# Patient Record
Sex: FEMALE | Race: WHITE | NOT HISPANIC OR LATINO | URBAN - METROPOLITAN AREA
[De-identification: names, ages, dates, MRNs, and addresses within clinical notes are randomized per-mention and may not be internally consistent; named-entity substitution may affect disease eponyms.]

---

## 2018-06-13 ENCOUNTER — OUTPATIENT (OUTPATIENT)
Dept: OUTPATIENT SERVICES | Facility: HOSPITAL | Age: 73
LOS: 1 days | Discharge: HOME | End: 2018-06-13
Payer: MEDICARE

## 2018-06-13 VITALS
HEIGHT: 64 IN | WEIGHT: 159.61 LBS | SYSTOLIC BLOOD PRESSURE: 159 MMHG | TEMPERATURE: 98 F | OXYGEN SATURATION: 99 % | HEART RATE: 75 BPM | DIASTOLIC BLOOD PRESSURE: 99 MMHG | RESPIRATION RATE: 16 BRPM

## 2018-06-13 DIAGNOSIS — Z01.818 ENCOUNTER FOR OTHER PREPROCEDURAL EXAMINATION: ICD-10-CM

## 2018-06-13 DIAGNOSIS — E05.00 THYROTOXICOSIS WITH DIFFUSE GOITER WITHOUT THYROTOXIC CRISIS OR STORM: ICD-10-CM

## 2018-06-13 DIAGNOSIS — M16.12 UNILATERAL PRIMARY OSTEOARTHRITIS, LEFT HIP: ICD-10-CM

## 2018-06-13 DIAGNOSIS — E78.5 HYPERLIPIDEMIA, UNSPECIFIED: ICD-10-CM

## 2018-06-13 DIAGNOSIS — M25.559 PAIN IN UNSPECIFIED HIP: ICD-10-CM

## 2018-06-13 DIAGNOSIS — Z98.890 OTHER SPECIFIED POSTPROCEDURAL STATES: Chronic | ICD-10-CM

## 2018-06-13 DIAGNOSIS — R01.1 CARDIAC MURMUR, UNSPECIFIED: ICD-10-CM

## 2018-06-13 DIAGNOSIS — M12.811 OTHER SPECIFIC ARTHROPATHIES, NOT ELSEWHERE CLASSIFIED, RIGHT SHOULDER: Chronic | ICD-10-CM

## 2018-06-13 LAB
ANION GAP SERPL CALC-SCNC: 7 MMOL/L — SIGNIFICANT CHANGE UP (ref 5–17)
APTT BLD: 30.7 SEC — SIGNIFICANT CHANGE UP (ref 27.5–37.4)
BASOPHILS # BLD AUTO: 0.02 K/UL — SIGNIFICANT CHANGE UP (ref 0–0.2)
BASOPHILS NFR BLD AUTO: 0.3 % — SIGNIFICANT CHANGE UP (ref 0–2)
BUN SERPL-MCNC: 17 MG/DL — SIGNIFICANT CHANGE UP (ref 7–23)
CALCIUM SERPL-MCNC: 9.4 MG/DL — SIGNIFICANT CHANGE UP (ref 8.5–10.1)
CHLORIDE SERPL-SCNC: 106 MMOL/L — SIGNIFICANT CHANGE UP (ref 96–108)
CO2 SERPL-SCNC: 30 MMOL/L — SIGNIFICANT CHANGE UP (ref 22–31)
CREAT SERPL-MCNC: 0.75 MG/DL — SIGNIFICANT CHANGE UP (ref 0.5–1.3)
EOSINOPHIL # BLD AUTO: 0.03 K/UL — SIGNIFICANT CHANGE UP (ref 0–0.5)
EOSINOPHIL NFR BLD AUTO: 0.5 % — SIGNIFICANT CHANGE UP (ref 0–6)
GLUCOSE SERPL-MCNC: 82 MG/DL — SIGNIFICANT CHANGE UP (ref 70–99)
HBA1C BLD-MCNC: 5.6 % — SIGNIFICANT CHANGE UP (ref 4–5.6)
HCT VFR BLD CALC: 45.8 % — HIGH (ref 34.5–45)
HGB BLD-MCNC: 14 G/DL — SIGNIFICANT CHANGE UP (ref 11.5–15.5)
IMM GRANULOCYTES NFR BLD AUTO: 0.2 % — SIGNIFICANT CHANGE UP (ref 0–1.5)
INR BLD: 0.96 RATIO — SIGNIFICANT CHANGE UP (ref 0.88–1.16)
LYMPHOCYTES # BLD AUTO: 1.73 K/UL — SIGNIFICANT CHANGE UP (ref 1–3.3)
LYMPHOCYTES # BLD AUTO: 26.1 % — SIGNIFICANT CHANGE UP (ref 13–44)
MCHC RBC-ENTMCNC: 27.3 PG — SIGNIFICANT CHANGE UP (ref 27–34)
MCHC RBC-ENTMCNC: 30.6 GM/DL — LOW (ref 32–36)
MCV RBC AUTO: 89.3 FL — SIGNIFICANT CHANGE UP (ref 80–100)
MONOCYTES # BLD AUTO: 0.41 K/UL — SIGNIFICANT CHANGE UP (ref 0–0.9)
MONOCYTES NFR BLD AUTO: 6.2 % — SIGNIFICANT CHANGE UP (ref 2–14)
NEUTROPHILS # BLD AUTO: 4.44 K/UL — SIGNIFICANT CHANGE UP (ref 1.8–7.4)
NEUTROPHILS NFR BLD AUTO: 66.7 % — SIGNIFICANT CHANGE UP (ref 43–77)
PLATELET # BLD AUTO: 278 K/UL — SIGNIFICANT CHANGE UP (ref 150–400)
POTASSIUM SERPL-MCNC: 4.2 MMOL/L — SIGNIFICANT CHANGE UP (ref 3.5–5.3)
POTASSIUM SERPL-SCNC: 4.2 MMOL/L — SIGNIFICANT CHANGE UP (ref 3.5–5.3)
PROTHROM AB SERPL-ACNC: 10.4 SEC — SIGNIFICANT CHANGE UP (ref 9.8–12.7)
RBC # BLD: 5.13 M/UL — SIGNIFICANT CHANGE UP (ref 3.8–5.2)
RBC # FLD: 13.4 % — SIGNIFICANT CHANGE UP (ref 10.3–14.5)
SODIUM SERPL-SCNC: 143 MMOL/L — SIGNIFICANT CHANGE UP (ref 135–145)
WBC # BLD: 6.64 K/UL — SIGNIFICANT CHANGE UP (ref 3.8–10.5)
WBC # FLD AUTO: 6.64 K/UL — SIGNIFICANT CHANGE UP (ref 3.8–10.5)

## 2018-06-13 PROCEDURE — 93010 ELECTROCARDIOGRAM REPORT: CPT | Mod: NC

## 2018-06-13 NOTE — H&P PST ADULT - NSANTHOSAYNRD_GEN_A_CORE
No. STEFANIA screening performed.  STOP BANG Legend: 0-2 = LOW Risk; 3-4 = INTERMEDIATE Risk; 5-8 = HIGH Risk

## 2018-06-13 NOTE — PHYSICAL THERAPY INITIAL EVALUATION ADULT - MODIFIED CLINICAL TEST OF SENSORY INTEGRATION IN BALANCE TEST
5x sit to stand = 14.66 seconds. 2MWT = 280ft with no device (no gait deviations). Stairs: step-over-step with R rail for ascent (pt states at end of day she typically performs step-to-step due to fatigue)

## 2018-06-13 NOTE — PATIENT PROFILE ADULT. - VISION (WITH CORRECTIVE LENSES IF THE PATIENT USUALLY WEARS THEM):
Partially impaired: cannot see medication labels or newsprint, but can see obstacles in path, and the surrounding layout; can count fingers at arm's length/Glassess for distance

## 2018-06-13 NOTE — H&P PST ADULT - VISION (WITH CORRECTIVE LENSES IF THE PATIENT USUALLY WEARS THEM):
Glassess for distance/Partially impaired: cannot see medication labels or newsprint, but can see obstacles in path, and the surrounding layout; can count fingers at arm's length

## 2018-06-13 NOTE — H&P PST ADULT - LYMPH NODES
Problem: Goal Outcome Summary  Goal: Goal Outcome Summary  OT/6C:  Improved participation noted this session compared to previous.  Pt tolerates ~7 minutes of standing ADLs at sink with set up and SBA with focus on improving endurance and strength.  Pt ambulates ~75' X2 with SBA and pt pushing IV pole with B UEs, requires 1 seated rest break between bouts due to fatigue and SOB.  SpO2 dips to 74% on 4L via NC with ambulation but recovers >90% within seconds of seated rest and PLB.  Limited by fatigue and endurance.       REC:  TCU for increased safety and IND with I/ADLs and functional mobility.       No lymphadedenopathy

## 2018-06-13 NOTE — PATIENT PROFILE ADULT. - PSH
H/O breast biopsy    Rotator cuff arthropathy, right H/O breast biopsy  left  Rotator cuff arthropathy, right

## 2018-06-13 NOTE — PHYSICAL THERAPY INITIAL EVALUATION ADULT - ADDITIONAL COMMENTS
Pt lives with spouse (whom can provide assist with all functional mobility upon D/C home) in a private home, 7 entry steps (+ rail), 6 steps inside (+ rail). Pt has a walk in shower stall with a fixed shower head, no grab bars, comfort height toilet seat. Pt is independent with all functional mobility including community ambulation without a device & ADL's. Pt is very active. Pt had R RTC sx in the past. Pt has a torn L meniscus. Pt is retired. Goal of therapy: improve functional mobility. Pt has no DMEs.

## 2018-06-13 NOTE — H&P PST ADULT - ASSESSMENT
left hip osteoarthritis left hip osteoarthritis   CAPRINI SCORE    AGE RELATED RISK FACTORS                                                       MOBILITY RELATED FACTORS  [ ] Age 41-60 years                                            (1 Point)                  [ ] Bed rest                                                        (1 Point)  [ ] Age: 61-74 years                                           (2 Points)                [ ] Plaster cast                                                   (2 Points)  [x ] Age= 75 years                                              (3 Points)                 [ ] Bed bound for more than 72 hours                   (2 Points)    DISEASE RELATED RISK FACTORS                                               GENDER SPECIFIC FACTORS  [ ] Edema in the lower extremities                       (1 Point)                  [ ] Pregnancy                                                     (1 Point)  [ ] Varicose veins                                               (1 Point)                  [ ] Post-partum < 6 weeks                                   (1 Point)             [x ] BMI > 25 Kg/m2                                            (1 Point)                  [ ] Hormonal therapy  or oral contraception            (1 Point)                 [ ] Sepsis (in the previous month)                        (1 Point)                  [ ] History of pregnancy complications  [ ] Pneumonia or serious lung disease                                               [ ] Unexplained or recurrent                       (1 Point)           (in the previous month)                               (1 Point)  [ ] Abnormal pulmonary function test                     (1 Point)                 SURGERY RELATED RISK FACTORS  [ ] Acute myocardial infarction                              (1 Point)                 [ ]  Section                                            (1 Point)  [ ] Congestive heart failure (in the previous month)  (1 Point)                 [ ] Minor surgery                                                 (1 Point)   [ ] Inflammatory bowel disease                             (1 Point)                 [ ] Arthroscopic surgery                                        (2 Points)  [ ] Central venous access                                    (2 Points)                [ ] General surgery lasting more than 45 minutes   (2 Points)       [ ] Stroke (in the previous month)                          (5 Points)               [x ] Elective arthroplasty                                        (5 Points)                                                                                                                                               HEMATOLOGY RELATED FACTORS                                                 TRAUMA RELATED RISK FACTORS  [ ] Prior episodes of VTE                                     (3 Points)                 [ ] Fracture of the hip, pelvis, or leg                       (5 Points)  [ ] Positive family history for VTE                         (3 Points)                 [ ] Acute spinal cord injury (in the previous month)  (5 Points)  [ ] Prothrombin 87140 A                                      (3 Points)                 [ ] Paralysis  (less than 1 month)                          (5 Points)  [ ] Factor V Leiden                                             (3 Points)                 [ ] Multiple Trauma within 1 month                         (5 Points)  [ ] Lupus anticoagulants                                     (3 Points)                                                           [ ] Anticardiolipin antibodies                                (3 Points)                                                       [ ] High homocysteine in the blood                      (3 Points)                                             [ ] Other congenital or acquired thrombophilia       (3 Points)                                                [ ] Heparin induced thrombocytopenia                  (3 Points)                                          Total Score [  9        ]

## 2018-06-14 LAB
MRSA PCR RESULT.: SIGNIFICANT CHANGE UP
S AUREUS DNA NOSE QL NAA+PROBE: SIGNIFICANT CHANGE UP

## 2018-06-14 NOTE — OCCUPATIONAL THERAPY INITIAL EVALUATION ADULT - PERTINENT HX OF CURRENT PROBLEM, REHAB EVAL
Pt is slated for elective surgery for left, THR due to OA, pain and DJD. Pt has a history of multiple comorbidities.

## 2018-06-14 NOTE — OCCUPATIONAL THERAPY INITIAL EVALUATION ADULT - ADDITIONAL COMMENTS
Pt is functioning in her roles, self sufficient, driving & ambulating independently in the community without any assistive devices. Pt is right hand dominant and wears glasses for reading. Pt owns no DME Pt denied pain , but she reports she experiences pain with running, walking and kick boxing. Pt is functioning in her roles, self sufficient, driving & ambulating independently in the community without any assistive devices. Pt is right hand dominant and wears glasses for reading. Pt owns no DME .Pt denied pain , but she reports she experiences pain with running, walking and kick boxing.

## 2018-06-14 NOTE — OCCUPATIONAL THERAPY INITIAL EVALUATION ADULT - LIGHT TOUCH SENSATION, HEAD/NECK, REHAB EVAL
Date & Time: 6/2/2017, 8:53 PM  Patient: Shasha Buck  Attending Provider: Analy Whitaker MD      This certifies that I, Adela Para, a patient at an [facility], am leaving the facility voluntarily and against the advice within normal limits

## 2018-06-14 NOTE — OCCUPATIONAL THERAPY INITIAL EVALUATION ADULT - SOCIAL CONCERNS
Complex psychosocial needs/coping issues/Pt voiced concerns about her recovery at home. Pt has the support of her spouse to assist her after discharge home post-operatively.

## 2018-06-14 NOTE — OCCUPATIONAL THERAPY INITIAL EVALUATION ADULT - LIVES WITH, PROFILE
spouse/private house with 7 steps to  enter with  right hand rail. Once inside, pt has to negotiate a flight of stairs to access the bedroom and bathroom. The bathroom has a walk in shower and comfort height toilet.

## 2018-06-14 NOTE — OCCUPATIONAL THERAPY INITIAL EVALUATION ADULT - RANGE OF MOTION EXAMINATION, LOWER EXTREMITY
Left LE Active ROM was WFL (within functional limits)/Right LE Active ROM was WNL(within normal limits)/Right LE Passive ROM was WNL (within normal limits)/Left LE Passive ROM was WFL (w/i functional limits)

## 2018-06-26 ENCOUNTER — TRANSCRIPTION ENCOUNTER (OUTPATIENT)
Age: 73
End: 2018-06-26

## 2018-06-26 RX ORDER — ACETAMINOPHEN 500 MG
650 TABLET ORAL ONCE
Qty: 0 | Refills: 0 | Status: COMPLETED | OUTPATIENT
Start: 2018-06-27 | End: 2018-06-27

## 2018-06-27 ENCOUNTER — RESULT REVIEW (OUTPATIENT)
Age: 73
End: 2018-06-27

## 2018-06-27 ENCOUNTER — INPATIENT (INPATIENT)
Facility: HOSPITAL | Age: 73
LOS: 0 days | Discharge: HOME HEALTH | End: 2018-06-28
Attending: ORTHOPAEDIC SURGERY | Admitting: ORTHOPAEDIC SURGERY
Payer: MEDICARE

## 2018-06-27 ENCOUNTER — TRANSCRIPTION ENCOUNTER (OUTPATIENT)
Age: 73
End: 2018-06-27

## 2018-06-27 VITALS
HEART RATE: 85 BPM | SYSTOLIC BLOOD PRESSURE: 144 MMHG | RESPIRATION RATE: 16 BRPM | DIASTOLIC BLOOD PRESSURE: 79 MMHG | TEMPERATURE: 97 F | WEIGHT: 159.61 LBS | HEIGHT: 64 IN

## 2018-06-27 DIAGNOSIS — M16.12 UNILATERAL PRIMARY OSTEOARTHRITIS, LEFT HIP: ICD-10-CM

## 2018-06-27 DIAGNOSIS — E03.9 HYPOTHYROIDISM, UNSPECIFIED: ICD-10-CM

## 2018-06-27 DIAGNOSIS — M12.811 OTHER SPECIFIC ARTHROPATHIES, NOT ELSEWHERE CLASSIFIED, RIGHT SHOULDER: Chronic | ICD-10-CM

## 2018-06-27 DIAGNOSIS — Z98.890 OTHER SPECIFIED POSTPROCEDURAL STATES: Chronic | ICD-10-CM

## 2018-06-27 DIAGNOSIS — E78.5 HYPERLIPIDEMIA, UNSPECIFIED: ICD-10-CM

## 2018-06-27 LAB
ANION GAP SERPL CALC-SCNC: 6 MMOL/L — SIGNIFICANT CHANGE UP (ref 5–17)
BUN SERPL-MCNC: 12 MG/DL — SIGNIFICANT CHANGE UP (ref 7–23)
CALCIUM SERPL-MCNC: 8.4 MG/DL — LOW (ref 8.5–10.1)
CHLORIDE SERPL-SCNC: 108 MMOL/L — SIGNIFICANT CHANGE UP (ref 96–108)
CO2 SERPL-SCNC: 29 MMOL/L — SIGNIFICANT CHANGE UP (ref 22–31)
CREAT SERPL-MCNC: 0.75 MG/DL — SIGNIFICANT CHANGE UP (ref 0.5–1.3)
GLUCOSE SERPL-MCNC: 92 MG/DL — SIGNIFICANT CHANGE UP (ref 70–99)
HCT VFR BLD CALC: 39.3 % — SIGNIFICANT CHANGE UP (ref 34.5–45)
HGB BLD-MCNC: 12.4 G/DL — SIGNIFICANT CHANGE UP (ref 11.5–15.5)
MCHC RBC-ENTMCNC: 27.9 PG — SIGNIFICANT CHANGE UP (ref 27–34)
MCHC RBC-ENTMCNC: 31.6 GM/DL — LOW (ref 32–36)
MCV RBC AUTO: 88.5 FL — SIGNIFICANT CHANGE UP (ref 80–100)
NRBC # BLD: 0 /100 WBCS — SIGNIFICANT CHANGE UP (ref 0–0)
PLATELET # BLD AUTO: 226 K/UL — SIGNIFICANT CHANGE UP (ref 150–400)
POTASSIUM SERPL-MCNC: 4 MMOL/L — SIGNIFICANT CHANGE UP (ref 3.5–5.3)
POTASSIUM SERPL-SCNC: 4 MMOL/L — SIGNIFICANT CHANGE UP (ref 3.5–5.3)
RBC # BLD: 4.44 M/UL — SIGNIFICANT CHANGE UP (ref 3.8–5.2)
RBC # FLD: 13.5 % — SIGNIFICANT CHANGE UP (ref 10.3–14.5)
SODIUM SERPL-SCNC: 143 MMOL/L — SIGNIFICANT CHANGE UP (ref 135–145)
WBC # BLD: 8.68 K/UL — SIGNIFICANT CHANGE UP (ref 3.8–10.5)
WBC # FLD AUTO: 8.68 K/UL — SIGNIFICANT CHANGE UP (ref 3.8–10.5)

## 2018-06-27 PROCEDURE — 88311 DECALCIFY TISSUE: CPT | Mod: 26

## 2018-06-27 PROCEDURE — 88305 TISSUE EXAM BY PATHOLOGIST: CPT | Mod: 26

## 2018-06-27 PROCEDURE — 72170 X-RAY EXAM OF PELVIS: CPT | Mod: 26

## 2018-06-27 PROCEDURE — 99223 1ST HOSP IP/OBS HIGH 75: CPT

## 2018-06-27 RX ORDER — LEVOTHYROXINE SODIUM 125 MCG
125 TABLET ORAL DAILY
Qty: 0 | Refills: 0 | Status: DISCONTINUED | OUTPATIENT
Start: 2018-06-27 | End: 2018-06-28

## 2018-06-27 RX ORDER — ACETAMINOPHEN 500 MG
975 TABLET ORAL EVERY 8 HOURS
Qty: 0 | Refills: 0 | Status: DISCONTINUED | OUTPATIENT
Start: 2018-06-27 | End: 2018-06-28

## 2018-06-27 RX ORDER — SODIUM CHLORIDE 9 MG/ML
3 INJECTION INTRAMUSCULAR; INTRAVENOUS; SUBCUTANEOUS EVERY 8 HOURS
Qty: 0 | Refills: 0 | Status: DISCONTINUED | OUTPATIENT
Start: 2018-06-27 | End: 2018-06-27

## 2018-06-27 RX ORDER — ZALEPLON 10 MG
5 CAPSULE ORAL AT BEDTIME
Qty: 0 | Refills: 0 | Status: DISCONTINUED | OUTPATIENT
Start: 2018-06-27 | End: 2018-06-28

## 2018-06-27 RX ORDER — ONDANSETRON 8 MG/1
4 TABLET, FILM COATED ORAL ONCE
Qty: 0 | Refills: 0 | Status: DISCONTINUED | OUTPATIENT
Start: 2018-06-27 | End: 2018-06-27

## 2018-06-27 RX ORDER — SODIUM CHLORIDE 9 MG/ML
1000 INJECTION, SOLUTION INTRAVENOUS
Qty: 0 | Refills: 0 | Status: DISCONTINUED | OUTPATIENT
Start: 2018-06-27 | End: 2018-06-28

## 2018-06-27 RX ORDER — OXYCODONE HYDROCHLORIDE 5 MG/1
10 TABLET ORAL EVERY 4 HOURS
Qty: 0 | Refills: 0 | Status: DISCONTINUED | OUTPATIENT
Start: 2018-06-27 | End: 2018-06-28

## 2018-06-27 RX ORDER — EZETIMIBE 10 MG/1
1 TABLET ORAL
Qty: 0 | Refills: 0 | COMMUNITY

## 2018-06-27 RX ORDER — DOCUSATE SODIUM 100 MG
100 CAPSULE ORAL THREE TIMES A DAY
Qty: 0 | Refills: 0 | Status: DISCONTINUED | OUTPATIENT
Start: 2018-06-27 | End: 2018-06-28

## 2018-06-27 RX ORDER — FOLIC ACID 0.8 MG
1 TABLET ORAL DAILY
Qty: 0 | Refills: 0 | Status: DISCONTINUED | OUTPATIENT
Start: 2018-06-27 | End: 2018-06-28

## 2018-06-27 RX ORDER — LEVOTHYROXINE SODIUM 125 MCG
1 TABLET ORAL
Qty: 0 | Refills: 0 | COMMUNITY

## 2018-06-27 RX ORDER — LEVOTHYROXINE SODIUM 125 MCG
125 TABLET ORAL DAILY
Qty: 0 | Refills: 0 | Status: DISCONTINUED | OUTPATIENT
Start: 2018-06-27 | End: 2018-06-27

## 2018-06-27 RX ORDER — ONDANSETRON 8 MG/1
4 TABLET, FILM COATED ORAL EVERY 6 HOURS
Qty: 0 | Refills: 0 | Status: DISCONTINUED | OUTPATIENT
Start: 2018-06-27 | End: 2018-06-28

## 2018-06-27 RX ORDER — POLYETHYLENE GLYCOL 3350 17 G/17G
17 POWDER, FOR SOLUTION ORAL DAILY
Qty: 0 | Refills: 0 | Status: DISCONTINUED | OUTPATIENT
Start: 2018-06-27 | End: 2018-06-28

## 2018-06-27 RX ORDER — ACETAMINOPHEN 500 MG
1000 TABLET ORAL ONCE
Qty: 0 | Refills: 0 | Status: COMPLETED | OUTPATIENT
Start: 2018-06-27 | End: 2018-06-27

## 2018-06-27 RX ORDER — ASCORBIC ACID 60 MG
500 TABLET,CHEWABLE ORAL
Qty: 0 | Refills: 0 | Status: DISCONTINUED | OUTPATIENT
Start: 2018-06-27 | End: 2018-06-28

## 2018-06-27 RX ORDER — CELECOXIB 200 MG/1
200 CAPSULE ORAL ONCE
Qty: 0 | Refills: 0 | Status: COMPLETED | OUTPATIENT
Start: 2018-06-27 | End: 2018-06-27

## 2018-06-27 RX ORDER — PANTOPRAZOLE SODIUM 20 MG/1
40 TABLET, DELAYED RELEASE ORAL DAILY
Qty: 0 | Refills: 0 | Status: DISCONTINUED | OUTPATIENT
Start: 2018-06-27 | End: 2018-06-28

## 2018-06-27 RX ORDER — FERROUS SULFATE 325(65) MG
325 TABLET ORAL
Qty: 0 | Refills: 0 | Status: DISCONTINUED | OUTPATIENT
Start: 2018-06-27 | End: 2018-06-28

## 2018-06-27 RX ORDER — DIPHENHYDRAMINE HCL 50 MG
25 CAPSULE ORAL AT BEDTIME
Qty: 0 | Refills: 0 | Status: DISCONTINUED | OUTPATIENT
Start: 2018-06-27 | End: 2018-06-28

## 2018-06-27 RX ORDER — CELECOXIB 200 MG/1
200 CAPSULE ORAL
Qty: 0 | Refills: 0 | Status: DISCONTINUED | OUTPATIENT
Start: 2018-06-27 | End: 2018-06-28

## 2018-06-27 RX ORDER — SENNA PLUS 8.6 MG/1
2 TABLET ORAL AT BEDTIME
Qty: 0 | Refills: 0 | Status: DISCONTINUED | OUTPATIENT
Start: 2018-06-27 | End: 2018-06-28

## 2018-06-27 RX ORDER — CEFAZOLIN SODIUM 1 G
2000 VIAL (EA) INJECTION EVERY 8 HOURS
Qty: 0 | Refills: 0 | Status: COMPLETED | OUTPATIENT
Start: 2018-06-27 | End: 2018-06-28

## 2018-06-27 RX ORDER — SODIUM CHLORIDE 9 MG/ML
1000 INJECTION, SOLUTION INTRAVENOUS
Qty: 0 | Refills: 0 | Status: DISCONTINUED | OUTPATIENT
Start: 2018-06-27 | End: 2018-06-27

## 2018-06-27 RX ORDER — OXYCODONE HYDROCHLORIDE 5 MG/1
5 TABLET ORAL EVERY 4 HOURS
Qty: 0 | Refills: 0 | Status: DISCONTINUED | OUTPATIENT
Start: 2018-06-27 | End: 2018-06-28

## 2018-06-27 RX ORDER — TRANEXAMIC ACID 100 MG/ML
1000 INJECTION, SOLUTION INTRAVENOUS ONCE
Qty: 0 | Refills: 0 | Status: COMPLETED | OUTPATIENT
Start: 2018-06-27 | End: 2018-06-27

## 2018-06-27 RX ORDER — RALOXIFENE HYDROCHLORIDE 60 MG/1
60 TABLET, COATED ORAL DAILY
Qty: 0 | Refills: 0 | Status: DISCONTINUED | OUTPATIENT
Start: 2018-06-27 | End: 2018-06-27

## 2018-06-27 RX ORDER — ASPIRIN/CALCIUM CARB/MAGNESIUM 324 MG
325 TABLET ORAL
Qty: 0 | Refills: 0 | Status: DISCONTINUED | OUTPATIENT
Start: 2018-06-27 | End: 2018-06-28

## 2018-06-27 RX ORDER — HYDROMORPHONE HYDROCHLORIDE 2 MG/ML
1 INJECTION INTRAMUSCULAR; INTRAVENOUS; SUBCUTANEOUS
Qty: 0 | Refills: 0 | Status: DISCONTINUED | OUTPATIENT
Start: 2018-06-27 | End: 2018-06-27

## 2018-06-27 RX ORDER — HYDROMORPHONE HYDROCHLORIDE 2 MG/ML
1 INJECTION INTRAMUSCULAR; INTRAVENOUS; SUBCUTANEOUS EVERY 4 HOURS
Qty: 0 | Refills: 0 | Status: DISCONTINUED | OUTPATIENT
Start: 2018-06-27 | End: 2018-06-28

## 2018-06-27 RX ADMIN — Medication 1000 MILLIGRAM(S): at 16:00

## 2018-06-27 RX ADMIN — CELECOXIB 200 MILLIGRAM(S): 200 CAPSULE ORAL at 11:34

## 2018-06-27 RX ADMIN — SODIUM CHLORIDE 3 MILLILITER(S): 9 INJECTION INTRAMUSCULAR; INTRAVENOUS; SUBCUTANEOUS at 11:35

## 2018-06-27 RX ADMIN — Medication 325 MILLIGRAM(S): at 18:23

## 2018-06-27 RX ADMIN — TRANEXAMIC ACID 220 MILLIGRAM(S): 100 INJECTION, SOLUTION INTRAVENOUS at 15:26

## 2018-06-27 RX ADMIN — Medication 100 MILLIGRAM(S): at 20:49

## 2018-06-27 RX ADMIN — Medication 975 MILLIGRAM(S): at 22:07

## 2018-06-27 RX ADMIN — Medication 500 MILLIGRAM(S): at 18:23

## 2018-06-27 RX ADMIN — Medication 650 MILLIGRAM(S): at 11:34

## 2018-06-27 RX ADMIN — SODIUM CHLORIDE 75 MILLILITER(S): 9 INJECTION, SOLUTION INTRAVENOUS at 14:40

## 2018-06-27 RX ADMIN — Medication 400 MILLIGRAM(S): at 15:45

## 2018-06-27 NOTE — DISCHARGE NOTE ADULT - NSTOBACCOHOTLINE_GEN_A_CS
[facility] Smokers Quitline ([phone]) [facility] Smokers Quitline ([phone])

## 2018-06-27 NOTE — BRIEF OPERATIVE NOTE - PROCEDURE
<<-----Click on this checkbox to enter Procedure Total hip arthroplasty  06/27/2018  left - posterior approach  Active  [id number]

## 2018-06-27 NOTE — DISCHARGE NOTE ADULT - PATIENT PORTAL LINK FT
You can access the [facility] Patient Portal, offered by [facility], by registering with the following website: [email or web address]

## 2018-06-27 NOTE — DISCHARGE NOTE ADULT - HOME CARE AGENCY
RN FROM [facility] AT [place], [phone], WILL VISIT YOUR HOME ON 6/29/18 TO INITIATE SUPPORTIVE HOME CARE SERVICES.  SKILLED NURSING, PHYSICAL THERAPY, AND OCCUPATIONAL THERAPY EVALUATION WILL BE PROVIDED.

## 2018-06-27 NOTE — DISCHARGE NOTE ADULT - HOSPITAL COURSE
73yFemale with history of Left Hip Pain presenting for Left RASHEL by Dr. Nugent on 6/27/18. Risk and benefits of surgery were explained to the patient. The patient understood and agreed to proceed with surgery. Patient underwent the procedure with no intraoperative complications. Pt was brought in stable condition to the PACU. Once stable in PACU, pt was brought to the floor. During hospital stay pt was followed by Medicine, Pt had an uneventful hospital course. Pt is stable for discharge to Home with Home Care Services and PT

## 2018-06-27 NOTE — PATIENT PROFILE ADULT. - PATIENT PERSONAL STRENGTHS
flexibility/positive attitude/successful coping history/able to adapt/courageous/kelly/spirituality/strong support system

## 2018-06-27 NOTE — DISCHARGE NOTE ADULT - MEDICATION SUMMARY - MEDICATIONS TO TAKE
I will START or STAY ON the medications listed below when I get home from the hospital:    celecoxib 200 mg oral capsule  -- 1 cap(s) by mouth 2 times a day MDD:2 Tabs  -- Indication: For LEFT TOTAL HIP ARHTROPLASTY    traMADol 50 mg oral tablet  -- 1 tab(s) by mouth every 4 hours, As Needed MDD:6 Tabs  -- Caution federal law prohibits the transfer of this drug to any person other  than the person for whom it was prescribed.  May cause drowsiness.  Alcohol may intensify this effect.  Use care when operating dangerous machinery.  Obtain medical advice before taking any non-prescription drugs as some may affect the action of this medication.    -- Indication: For LEFT TOTAL HIP ARHTROPLASTY    aspirin 325 mg oral delayed release tablet  -- 1 tab(s) by mouth 2 times a day MDD:2 Tabs  -- Indication: For LEFT TOTAL HIP ARHTROPLASTY    Zetia 10 mg oral tablet  -- 1 tab(s) by mouth once a day  -- Indication: For LEFT TOTAL HIP ARHTROPLASTY    raloxifene 60 mg oral tablet  -- 1 tab(s) by mouth once a day  -- Indication: For LEFT TOTAL HIP ARHTROPLASTY    senna oral tablet  -- 2 tab(s) by mouth once a day (at bedtime), As needed, Constipation  -- Indication: For LEFT TOTAL HIP ARHTROPLASTY    pantoprazole 40 mg oral delayed release tablet  -- 1 tab(s) by mouth once a day MDD:1 Tab  -- Indication: For LEFT TOTAL HIP ARHTROPLASTY    Synthroid 125 mcg (0.125 mg) oral tablet  -- 1 tab(s) by mouth once a day  -- Indication: For LEFT TOTAL HIP ARHTROPLASTY    Multiple Vitamins oral tablet  -- 1 tab(s) by mouth once a day  -- Indication: For LEFT TOTAL HIP ARHTROPLASTY    ascorbic acid 500 mg oral tablet  -- 1 tab(s) by mouth 2 times a day  -- Indication: For LEFT TOTAL HIP ARHTROPLASTY

## 2018-06-27 NOTE — CONSULT NOTE ADULT - SUBJECTIVE AND OBJECTIVE BOX
73 year old female admitted for elective left THR. Seen on 1-E, NAD. POD 1. Comfortable.     Denies, HA, CP, SOB, fevers, abdominal pain, leg swelling. Remaing ROS WNL.     Allergies: NKDA    PSH: Right rotator cuff surgery.     PMH: Hypothyroidism and HLD.     Social: no smoking or ETOH use    PHYSICAL EXAMINATION:  Vital Signs Last 24 Hrs  T(C): 36.4 (27 Jun 2018 18:49), Max: 36.5 (27 Jun 2018 15:30)  T(F): 97.5 (27 Jun 2018 18:49), Max: 97.7 (27 Jun 2018 15:30)  HR: 89 (27 Jun 2018 18:49) (62 - 89)  BP: 130/84 (27 Jun 2018 18:49) (118/83 - 144/79)  BP(mean): --  RR: 17 (27 Jun 2018 18:49) (14 - 20)  SpO2: 96% (27 Jun 2018 18:49) (96% - 100%)  CAPILLARY BLOOD GLUCOSE          GENERAL: NAD, well-groomed, well-developed  HEAD:  atraumatic, normocephalic  EYES: sclera anicteric  ENMT: mucous membranes moist  NECK: supple, No JVD  CHEST/LUNG: clear to auscultation bilaterally; no rales, rhonchi, or wheezing b/l  HEART: normal S1, S2  ABDOMEN: BS+, soft, ND, NT   EXTREMITIES:  pulses palpable; no clubbing, cyanosis, or edema b/l LEs  NEURO: awake, alert, interactive; moves all extremities  SKIN: no rashes or lesions          LABS:                        12.4   8.68  )-----------( 226      ( 27 Jun 2018 14:51 )             39.3     06-27    143  |  108  |  12  ----------------------------<  92  4.0   |  29  |  0.75    Ca    8.4<L>      27 Jun 2018 14:51

## 2018-06-27 NOTE — CONSULT NOTE ADULT - ASSESSMENT
73 year old female admitted for elective left total hip replacement.     Plan: Continue routine post-op care,  mg bid for DVT prevention.     Continue Synthyroid 125 mcg/day for hypothyroidism.  Zetia is non-formulary    and can resume after discharge. Will follow.

## 2018-06-27 NOTE — PROGRESS NOTE ADULT - SUBJECTIVE AND OBJECTIVE BOX
Post-op Check   POD#0 s/p Left RASHEL   73yFemale Patient seen and examined, Pain controlled  Patient Denies SOB, CP, N/V/D       PE: Left Hip/LE: Dressing C/D/I, Sensation/motor intact, DP 2+, FROM ankle/toes   B/L LE: Skin intact. +ROM hip/knee/ankle/toes. Ankle Dorsi/plantarflexion: 5/5. Calf: soft, compressible and nontender. DP/PT 2+ NVI.                          12.4   8.68  )-----------( 226      ( 27 Jun 2018 14:51 )             39.3       06-27    143  |  108  |  12  ----------------------------<  92  4.0   |  29  |  0.75    Ca    8.4<L>      27 Jun 2018 14:51          A: As above   P: Pain Control       DVT Prophylaxis      Incentive spirometry      Total hip precautions Reviewed       PT WBAT LLE      Isometric exercises      Discharge Planning      All the above discussed and understood by pt       Ortho to F/U

## 2018-06-27 NOTE — DISCHARGE NOTE ADULT - CARE PROVIDER_API CALL
Saeid Nugent), Orthopaedic Surgery  [address] [place] [address]  Phone: [phone]  Fax: [phone]

## 2018-06-27 NOTE — DISCHARGE NOTE ADULT - CARE PLAN
Principal Discharge DX:	Primary osteoarthritis of left hip  Goal:	Improve Function, Decrease Pain  Assessment and plan of treatment:	Keep Prineo Dressing Clean, Dry and Intact. May shower with Prineo Dressing. Please do not scrub, soak, peel or pick at the prineo dressing. No creams, lotions, or oils over dressing. May shower and let water run over incision, no baths. Pat dry once out of shower. Dressing to be removed in office at follow up visit in 2 weeks.

## 2018-06-27 NOTE — DISCHARGE NOTE ADULT - NS AS DC FOLLOWUP STROKE INST
Take your medications exactly as prescribed. Having your pain under control will help increase activity, improve deep breathing and coughing and prevent complications like pneumonia and blood clots in your legs. Some of the common side effects of pain medications are nausea, vomiting, itching, rash and upset stomach. Contact your doctor if you develop these or any other unusual systoms. Eat a diet rich in fiber and drink plenty of oral fluids. Use other pain management methods like, cold and warm applications, elevation of affected body part, listening to music, watching TV, yoga, etc.Do not take any other medications unless approved by your doctor. Do not drive, operate machinery, drink alcohol, or make any important decisions while taking narcotic pain medications. Store medication in its original bottle, in a locked cabinet away from the reach of children. Dispose of any unused and  medication safely./Smoking Cessation

## 2018-06-27 NOTE — PHYSICAL THERAPY INITIAL EVALUATION ADULT - ACTIVE RANGE OF MOTION EXAMINATION, REHAB EVAL
Left UE Active ROM was WFL (within functional limits)/Right LE Active ROM was WFL (within functional limits)/L hip within hip precautions, no adduction, hip flexion to 90 degrees and no internal rotation/Right UE Active ROM was WFL (within functional limits)

## 2018-06-27 NOTE — PHYSICAL THERAPY INITIAL EVALUATION ADULT - ADDITIONAL COMMENTS
Patient reports that she lives in a private house with no steps to enter and 7 steps and then 6 steps once inside with right rail. Patient reports that she was independent without assistive device prior to surgery.

## 2018-06-28 VITALS
RESPIRATION RATE: 18 BRPM | DIASTOLIC BLOOD PRESSURE: 66 MMHG | SYSTOLIC BLOOD PRESSURE: 142 MMHG | TEMPERATURE: 98 F | OXYGEN SATURATION: 100 % | HEART RATE: 79 BPM

## 2018-06-28 LAB
ANION GAP SERPL CALC-SCNC: 11 MMOL/L — SIGNIFICANT CHANGE UP (ref 5–17)
BUN SERPL-MCNC: 13 MG/DL — SIGNIFICANT CHANGE UP (ref 7–23)
CALCIUM SERPL-MCNC: 8.2 MG/DL — LOW (ref 8.5–10.1)
CHLORIDE SERPL-SCNC: 106 MMOL/L — SIGNIFICANT CHANGE UP (ref 96–108)
CO2 SERPL-SCNC: 25 MMOL/L — SIGNIFICANT CHANGE UP (ref 22–31)
CREAT SERPL-MCNC: 0.81 MG/DL — SIGNIFICANT CHANGE UP (ref 0.5–1.3)
GLUCOSE BLDC GLUCOMTR-MCNC: 147 MG/DL — HIGH (ref 70–99)
GLUCOSE SERPL-MCNC: 105 MG/DL — HIGH (ref 70–99)
HCT VFR BLD CALC: 35.5 % — SIGNIFICANT CHANGE UP (ref 34.5–45)
HGB BLD-MCNC: 11.5 G/DL — SIGNIFICANT CHANGE UP (ref 11.5–15.5)
MCHC RBC-ENTMCNC: 27.8 PG — SIGNIFICANT CHANGE UP (ref 27–34)
MCHC RBC-ENTMCNC: 32.4 GM/DL — SIGNIFICANT CHANGE UP (ref 32–36)
MCV RBC AUTO: 85.7 FL — SIGNIFICANT CHANGE UP (ref 80–100)
NRBC # BLD: 0 /100 WBCS — SIGNIFICANT CHANGE UP (ref 0–0)
PLATELET # BLD AUTO: 236 K/UL — SIGNIFICANT CHANGE UP (ref 150–400)
POTASSIUM SERPL-MCNC: 3.9 MMOL/L — SIGNIFICANT CHANGE UP (ref 3.5–5.3)
POTASSIUM SERPL-SCNC: 3.9 MMOL/L — SIGNIFICANT CHANGE UP (ref 3.5–5.3)
RBC # BLD: 4.14 M/UL — SIGNIFICANT CHANGE UP (ref 3.8–5.2)
RBC # FLD: 13.3 % — SIGNIFICANT CHANGE UP (ref 10.3–14.5)
SODIUM SERPL-SCNC: 142 MMOL/L — SIGNIFICANT CHANGE UP (ref 135–145)
WBC # BLD: 9.88 K/UL — SIGNIFICANT CHANGE UP (ref 3.8–10.5)
WBC # FLD AUTO: 9.88 K/UL — SIGNIFICANT CHANGE UP (ref 3.8–10.5)

## 2018-06-28 PROCEDURE — 99232 SBSQ HOSP IP/OBS MODERATE 35: CPT

## 2018-06-28 RX ORDER — TRAMADOL HYDROCHLORIDE 50 MG/1
1 TABLET ORAL
Qty: 42 | Refills: 0 | OUTPATIENT
Start: 2018-06-28 | End: 2018-07-04

## 2018-06-28 RX ORDER — ASCORBIC ACID 60 MG
1 TABLET,CHEWABLE ORAL
Qty: 0 | Refills: 0 | COMMUNITY
Start: 2018-06-28

## 2018-06-28 RX ORDER — PANTOPRAZOLE SODIUM 20 MG/1
1 TABLET, DELAYED RELEASE ORAL
Qty: 30 | Refills: 0 | OUTPATIENT
Start: 2018-06-28 | End: 2018-07-27

## 2018-06-28 RX ORDER — CELECOXIB 200 MG/1
1 CAPSULE ORAL
Qty: 60 | Refills: 0 | OUTPATIENT
Start: 2018-06-28 | End: 2018-07-27

## 2018-06-28 RX ORDER — SENNA PLUS 8.6 MG/1
2 TABLET ORAL
Qty: 0 | Refills: 0 | COMMUNITY
Start: 2018-06-28

## 2018-06-28 RX ORDER — ASPIRIN/CALCIUM CARB/MAGNESIUM 324 MG
1 TABLET ORAL
Qty: 60 | Refills: 0 | OUTPATIENT
Start: 2018-06-28 | End: 2018-07-27

## 2018-06-28 RX ADMIN — Medication 975 MILLIGRAM(S): at 14:14

## 2018-06-28 RX ADMIN — Medication 325 MILLIGRAM(S): at 11:39

## 2018-06-28 RX ADMIN — Medication 500 MILLIGRAM(S): at 05:00

## 2018-06-28 RX ADMIN — Medication 1 MILLIGRAM(S): at 11:39

## 2018-06-28 RX ADMIN — CELECOXIB 200 MILLIGRAM(S): 200 CAPSULE ORAL at 05:00

## 2018-06-28 RX ADMIN — Medication 1 TABLET(S): at 11:39

## 2018-06-28 RX ADMIN — Medication 100 MILLIGRAM(S): at 03:52

## 2018-06-28 RX ADMIN — Medication 325 MILLIGRAM(S): at 07:47

## 2018-06-28 RX ADMIN — Medication 325 MILLIGRAM(S): at 05:00

## 2018-06-28 RX ADMIN — PANTOPRAZOLE SODIUM 40 MILLIGRAM(S): 20 TABLET, DELAYED RELEASE ORAL at 11:39

## 2018-06-28 RX ADMIN — SENNA PLUS 2 TABLET(S): 8.6 TABLET ORAL at 05:13

## 2018-06-28 RX ADMIN — Medication 975 MILLIGRAM(S): at 05:01

## 2018-06-28 RX ADMIN — Medication 125 MICROGRAM(S): at 05:00

## 2018-06-28 NOTE — OCCUPATIONAL THERAPY INITIAL EVALUATION ADULT - SOCIAL CONCERNS
Complex psychosocial needs/coping issues/Pt voiced concerns about her recovery at home.  Pt has the support of her  to assist her after discharge home .

## 2018-06-28 NOTE — OCCUPATIONAL THERAPY INITIAL EVALUATION ADULT - PLANNED THERAPY INTERVENTIONS, OT EVAL
ADL retraining/parent/caregiver training.../transfer training/balance training/neuromuscular re-education/ROM/IADL retraining/bed mobility training/strengthening/stretching/energy conservation techniques

## 2018-06-28 NOTE — OCCUPATIONAL THERAPY INITIAL EVALUATION ADULT - PERSONAL SAFETY AND JUDGMENT, REHAB EVAL
at risk behaviors demonstrated/however, pt needs reminder to safely incorporate THP with ADL., functional mobility and to avoid internal rotation of left hip during turns./intact

## 2018-06-28 NOTE — PROGRESS NOTE ADULT - ASSESSMENT
73 year old female admitted for elective left total hip replacement.     Plan: Continue routine post-op care,  mg bid for DVT prevention.     Continue Synthyroid 125 mcg/day for hypothyroidism.  Zetia is non-formulary    and can resume after discharge. Had RRT this AM for likely vasovagal episode, now resolved    after IVF.  BP stable, OK for discharge.

## 2018-06-28 NOTE — OCCUPATIONAL THERAPY INITIAL EVALUATION ADULT - ADDITIONAL COMMENTS
Prior to admission pt  was functioning in her roles, self sufficient, driving & ambulating independently in the community without any assistive devices. Presently, pt needs assistance with functional mobility ant to complete lower body self care tasks due to s/p left THR. The scale below depicts a picture of the pt's current level of functioning. Barthel Index: Feeding Score____10__, Bathing Score__0____, Grooming Score___5__, Dressing Score___5__, Bowel Score__10___, Bladder Score____10__, Toilet Score___5__, Transfer Score___10___, Mobility Score___10__, Stairs Score___5__, Total Score___70/100_. Pt is right hand dominant and wears glasses for reading.

## 2018-06-28 NOTE — OCCUPATIONAL THERAPY INITIAL EVALUATION ADULT - PERTINENT HX OF CURRENT PROBLEM, REHAB EVAL
Pt was admitted for elective surgery for left THR due to OA, pain and DJD. Pt has a history of multiple comorbidities.

## 2018-06-28 NOTE — OCCUPATIONAL THERAPY INITIAL EVALUATION ADULT - LIVES WITH, PROFILE
spouse/in a private house  with 3 side steps  with rails. Once inside, pt has to negotiate a split flight of 7 stairs + a land and another 6 steps with right to access the bedroom and bathroom.  The bathroom has a tub/shower combination and standard toilet. in a private house  with 3 side steps with rails. Once inside, pt has to negotiate a split flight of 7 stairs + a landing and another 6 steps with right to access the bedroom and bathroom. The bathroom has a tub/shower combination and standard toilet./spouse

## 2018-06-28 NOTE — PROGRESS NOTE ADULT - SUBJECTIVE AND OBJECTIVE BOX
INTERVAL HPI/OVERNIGHT EVENTS:  Pt seen and examined at bedside.     Allergies/Intolerance: No Known Allergies      MEDICATIONS  (STANDING):  acetaminophen   Tablet 975 milliGRAM(s) Oral every 8 hours  ascorbic acid 500 milliGRAM(s) Oral two times a day  aspirin enteric coated 325 milliGRAM(s) Oral two times a day  celecoxib 200 milliGRAM(s) Oral two times a day  docusate sodium 100 milliGRAM(s) Oral three times a day  ferrous    sulfate 325 milliGRAM(s) Oral three times a day with meals  folic acid 1 milliGRAM(s) Oral daily  levothyroxine 125 MICROGram(s) Oral daily  multivitamin 1 Tablet(s) Oral daily  pantoprazole    Tablet 40 milliGRAM(s) Oral daily  polyethylene glycol 3350 17 Gram(s) Oral daily    MEDICATIONS  (PRN):  aluminum hydroxide/magnesium hydroxide/simethicone Suspension 30 milliLiter(s) Oral four times a day PRN Indigestion  diphenhydrAMINE   Capsule 25 milliGRAM(s) Oral at bedtime PRN Insomnia  ondansetron Injectable 4 milliGRAM(s) IV Push every 6 hours PRN Nausea and/or Vomiting  oxyCODONE    IR 5 milliGRAM(s) Oral every 4 hours PRN Mild Pain  oxyCODONE    IR 10 milliGRAM(s) Oral every 4 hours PRN Moderate Pain  senna 2 Tablet(s) Oral at bedtime PRN Constipation  zaleplon 5 milliGRAM(s) Oral at bedtime PRN Insomnia        ROS: all systems reviewed and wnl      PHYSICAL EXAMINATION:  Vital Signs Last 24 Hrs  T(C): 36.1 (28 Jun 2018 07:30), Max: 36.5 (27 Jun 2018 15:30)  T(F): 97 (28 Jun 2018 07:30), Max: 97.7 (27 Jun 2018 15:30)  HR: 81 (28 Jun 2018 10:19) (62 - 95)  BP: 137/85 (28 Jun 2018 10:19) (114/58 - 149/86)  BP(mean): --  RR: 16 (28 Jun 2018 08:35) (14 - 20)  SpO2: 97% (28 Jun 2018 10:19) (94% - 100%)  CAPILLARY BLOOD GLUCOSE          06-27 @ 07:01  -  06-28 @ 07:00  --------------------------------------------------------  IN: 1705 mL / OUT: 0 mL / NET: 1705 mL        GENERAL:   NECK: supple, No JVD  CHEST/LUNG: clear to auscultation bilaterally; no rales, rhonchi, or wheezing b/l  HEART: normal S1, S2  ABDOMEN: BS+, soft, ND, NT   EXTREMITIES:  pulses palpable; no clubbing, cyanosis, or edema b/l LEs  SKIN: no rashes or lesions      LABS:                        11.5   9.88  )-----------( 236      ( 28 Jun 2018 06:11 )             35.5     06-28    142  |  106  |  13  ----------------------------<  105<H>  3.9   |  25  |  0.81    Ca    8.2<L>      28 Jun 2018 06:11 INTERVAL HPI/OVERNIGHT EVENTS:  Pt seen and examined at bedside.     Allergies/Intolerance: No Known Allergies      MEDICATIONS  (STANDING):  acetaminophen   Tablet 975 milliGRAM(s) Oral every 8 hours  ascorbic acid 500 milliGRAM(s) Oral two times a day  aspirin enteric coated 325 milliGRAM(s) Oral two times a day  celecoxib 200 milliGRAM(s) Oral two times a day  docusate sodium 100 milliGRAM(s) Oral three times a day  ferrous    sulfate 325 milliGRAM(s) Oral three times a day with meals  folic acid 1 milliGRAM(s) Oral daily  levothyroxine 125 MICROGram(s) Oral daily  multivitamin 1 Tablet(s) Oral daily  pantoprazole    Tablet 40 milliGRAM(s) Oral daily  polyethylene glycol 3350 17 Gram(s) Oral daily    MEDICATIONS  (PRN):  aluminum hydroxide/magnesium hydroxide/simethicone Suspension 30 milliLiter(s) Oral four times a day PRN Indigestion  diphenhydrAMINE   Capsule 25 milliGRAM(s) Oral at bedtime PRN Insomnia  ondansetron Injectable 4 milliGRAM(s) IV Push every 6 hours PRN Nausea and/or Vomiting  oxyCODONE    IR 5 milliGRAM(s) Oral every 4 hours PRN Mild Pain  oxyCODONE    IR 10 milliGRAM(s) Oral every 4 hours PRN Moderate Pain  senna 2 Tablet(s) Oral at bedtime PRN Constipation  zaleplon 5 milliGRAM(s) Oral at bedtime PRN Insomnia        ROS: all systems reviewed and wnl      PHYSICAL EXAMINATION:  Vital Signs Last 24 Hrs  T(C): 36.1 (28 Jun 2018 07:30), Max: 36.5 (27 Jun 2018 15:30)  T(F): 97 (28 Jun 2018 07:30), Max: 97.7 (27 Jun 2018 15:30)  HR: 81 (28 Jun 2018 10:19) (62 - 95)  BP: 137/85 (28 Jun 2018 10:19) (114/58 - 149/86)  BP(mean): --  RR: 16 (28 Jun 2018 08:35) (14 - 20)  SpO2: 97% (28 Jun 2018 10:19) (94% - 100%)  CAPILLARY BLOOD GLUCOSE          06-27 @ 07:01  -  06-28 @ 07:00  --------------------------------------------------------  IN: 1705 mL / OUT: 0 mL / NET: 1705 mL        GENERAL: stable in bed, dressed for discharge, no fevers, CP, SOB   NECK: supple, No JVD  CHEST/LUNG: clear to auscultation bilaterally; no rales, rhonchi, or wheezing b/l  HEART: normal S1, S2  ABDOMEN: BS+, soft, ND, NT   EXTREMITIES:  pulses palpable; no clubbing, cyanosis, or edema b/l LEs  SKIN: no rashes or lesions      LABS:                        11.5   9.88  )-----------( 236      ( 28 Jun 2018 06:11 )             35.5     06-28    142  |  106  |  13  ----------------------------<  105<H>  3.9   |  25  |  0.81    Ca    8.2<L>      28 Jun 2018 06:11

## 2018-06-28 NOTE — OCCUPATIONAL THERAPY INITIAL EVALUATION ADULT - RANGE OF MOTION EXAMINATION, LOWER EXTREMITY
AROM of left hip is 0-45 degree with pain/Right LE Passive ROM was WNL (within normal limits)/Right LE Active ROM was WNL(within normal limits)

## 2018-06-28 NOTE — OCCUPATIONAL THERAPY INITIAL EVALUATION ADULT - ANTICIPATED DISCHARGE DISPOSITION, OT EVAL
Recommend home with OT referral to enable patient to safely perform ADL management and functional mobility. Pt has received a 3-in-1 commode, rolling walker and SAC.

## 2018-06-28 NOTE — PROGRESS NOTE ADULT - SUBJECTIVE AND OBJECTIVE BOX
POD#1 s/p Left RASHEL   73yFemale Patient seen and examined with Dr. Nugent, Pain controlled  Patient Denies SOB, CP, N/V/D       PE: Left Hip/LE: Dressing C/D/I, Sensation/motor intact, DP 2+, FROM ankle/toes   B/L LE: Skin intact. +ROM hip/knee/ankle/toes. Ankle Dorsi/plantarflexion: 5/5. Calf: soft, compressible and nontender. DP/PT 2+ NVI.                           12.4   8.68  )-----------( 226      ( 27 Jun 2018 14:51 )             39.3       06-27    143  |  108  |  12  ----------------------------<  92  4.0   |  29  |  0.75    Ca    8.4<L>      27 Jun 2018 14:51          A: As above   P: Pain Control       DVT Prophylaxis      Incentive spirometry      Total hip precautions Reviewed       PT WBAT LLE      Isometric exercises      Discharge Planning      All the above discussed and understood by pt       Ortho to F/U

## 2018-06-28 NOTE — OCCUPATIONAL THERAPY INITIAL EVALUATION ADULT - GENERAL OBSERVATIONS, REHAB EVAL
Pt was seen for initial OT consult, encountered OOB to chair  with her  at bedside. pt was AA&Ox4, cooperative & followed commands. Pt denied pain, but c/o discomfort in left hip due to s/p THR; this limits pt's activity tolerance ,balance, ADL management and functional mobility. Posterior THP were reviewed & maintained. Pt was seen for initial OT consult, encountered OOB to chair with her  at bedside. Pt was AA&Ox4, cooperative & followed commands. Pt denied pain, but c/o discomfort in left hip due to s/p THR; this limits pt's activity tolerance ,balance, ADL management and functional mobility. Posterior THP were reviewed & maintained.

## 2018-07-01 ENCOUNTER — EMERGENCY (EMERGENCY)
Facility: HOSPITAL | Age: 73
LOS: 0 days | Discharge: HOME | End: 2018-07-02
Attending: EMERGENCY MEDICINE
Payer: MEDICARE

## 2018-07-01 VITALS
WEIGHT: 154.98 LBS | RESPIRATION RATE: 16 BRPM | DIASTOLIC BLOOD PRESSURE: 84 MMHG | HEART RATE: 85 BPM | OXYGEN SATURATION: 98 % | HEIGHT: 64 IN | TEMPERATURE: 99 F | SYSTOLIC BLOOD PRESSURE: 146 MMHG

## 2018-07-01 VITALS
TEMPERATURE: 98 F | OXYGEN SATURATION: 99 % | DIASTOLIC BLOOD PRESSURE: 83 MMHG | SYSTOLIC BLOOD PRESSURE: 147 MMHG | RESPIRATION RATE: 16 BRPM | HEART RATE: 73 BPM

## 2018-07-01 DIAGNOSIS — Z79.82 LONG TERM (CURRENT) USE OF ASPIRIN: ICD-10-CM

## 2018-07-01 DIAGNOSIS — M12.811 OTHER SPECIFIC ARTHROPATHIES, NOT ELSEWHERE CLASSIFIED, RIGHT SHOULDER: Chronic | ICD-10-CM

## 2018-07-01 DIAGNOSIS — E03.9 HYPOTHYROIDISM, UNSPECIFIED: ICD-10-CM

## 2018-07-01 DIAGNOSIS — Z98.890 OTHER SPECIFIED POSTPROCEDURAL STATES: Chronic | ICD-10-CM

## 2018-07-01 DIAGNOSIS — E78.00 PURE HYPERCHOLESTEROLEMIA, UNSPECIFIED: ICD-10-CM

## 2018-07-01 DIAGNOSIS — M79.89 OTHER SPECIFIED SOFT TISSUE DISORDERS: ICD-10-CM

## 2018-07-01 DIAGNOSIS — Z96.642 PRESENCE OF LEFT ARTIFICIAL HIP JOINT: ICD-10-CM

## 2018-07-01 DIAGNOSIS — Z79.899 OTHER LONG TERM (CURRENT) DRUG THERAPY: ICD-10-CM

## 2018-07-01 PROCEDURE — 99284 EMERGENCY DEPT VISIT MOD MDM: CPT

## 2018-07-01 PROCEDURE — 93971 EXTREMITY STUDY: CPT | Mod: 26

## 2018-07-01 RX ORDER — RALOXIFENE HYDROCHLORIDE 60 MG/1
1 TABLET, COATED ORAL
Qty: 0 | Refills: 0 | COMMUNITY

## 2018-07-01 NOTE — ED PROVIDER NOTE - MEDICAL DECISION MAKING DETAILS
patient pw left leg swelling concerning for dvt. patient pw left leg swelling concerning for dvt. ultrasound neg. okay for dc home.

## 2018-07-01 NOTE — ED PROVIDER NOTE - OBJECTIVE STATEMENT
Pertinent PMH/PSH/FHx/SHx and Review of Systems contained within:  73F hx of hypothyroid, hld and left hip replacement 6/27 pw 1 left leg swelling. no cp, sob, nausea, vomiting, rash, weakness Pertinent PMH/PSH/FHx/SHx and Review of Systems contained within:  73F hx of hypothyroid, hld and left hip replacement 6/27 pw 1 left leg swelling. no cp, sob, nausea, vomiting, rash, weakness, fever, redness of skin. patient reports that she has not done anything about he symptoms other than come here. the hip itself is starting to feel fine  Fh and Sh not otherwise contributory  ROS otherwise negative

## 2018-07-01 NOTE — ED PROVIDER NOTE - PHYSICAL EXAMINATION
Gen: Alert, NAD  Head: NC, AT   Eyes: PERRL, EOMI, normal lids/conjunctiva  ENT: normal hearing, patent oropharynx without erythema/exudate, uvula midline  Neck: supple, no tenderness, Trachea midline  Pulm: Bilateral BS, normal resp effort, no wheeze/stridor/retractions  CV: RRR, no M/R/G, 2+ radial and dp pulses bl, left leg edema considerably larger than the left leg  Abd: soft, NT/ND, +BS, no hepatosplenomegaly  Mskel: extremities x4 with normal ROM and no joint effusions. no ctl spine ttp.   Skin: no rash, no bruising   Neuro: AAOx3, no sensory/motor deficits, CN 2-12 intact

## 2018-07-02 LAB — SURGICAL PATHOLOGY FINAL REPORT - CH: SIGNIFICANT CHANGE UP

## 2019-03-04 NOTE — H&P PST ADULT - BLOOD TRANSFUSION, PREVIOUS, PROFILE
Patient would like a call from [address] or [address] [place] [address] St regarding his medication he can be reached @ [phone] no

## 2021-05-24 ENCOUNTER — TRANSCRIPTION ENCOUNTER (OUTPATIENT)
Age: 76
End: 2021-05-24

## 2021-05-31 ENCOUNTER — TRANSCRIPTION ENCOUNTER (OUTPATIENT)
Age: 76
End: 2021-05-31

## 2021-08-17 NOTE — ED ADULT NURSE NOTE - CAS DISCH ACCOMP BY
"  Office Visit      Patient Name: Zora Augustin  : 2003   MRN: [id number]   Care Team: Patient Care Team:  Estrella Pro APRN as PCP - General (Family Medicine)  Escobar Erwin MD as Consulting Physician (General Surgery)    Chief Complaint  Vaginal Itching (discharge and odor ongoing x 3 days )    Subjective     Subjective      Zora Augustin presents to [facility] for vaginal itching and discharge. Symptoms started 3 days ago. Endorses itching, dysuria, moderate amount of thick white discharge.  Denies flank pain, hematuria, urinary frequency, urinary urgency, and recent antibiotic use. No history of frequent yeast infections or diabetes. No new sexual partners and denies STD exposure.   History of chlamydia last summer that was successfully treated and she was checked for clearance.   Has tried increasing water intake.     Review of Systems   Constitutional: Negative for chills, fatigue and fever.   Respiratory: Negative for shortness of breath.    Cardiovascular: Negative for chest pain.   Gastrointestinal: Negative for abdominal distention, abdominal pain, constipation and nausea.   Genitourinary: Positive for dysuria and vaginal discharge. Negative for decreased libido, decreased urine volume, difficulty urinating, flank pain, frequency, hematuria and urgency.   Neurological: Negative for headache.   Psychiatric/Behavioral: Negative for sleep disturbance.     Objective     Objective   Vital Signs:   /62   Pulse 90   Temp 97 °F (36.1 °C) (Temporal)   Resp 18   Ht 165.1 cm (65\")   Wt 88.5 kg (195 lb)   SpO2 99%   BMI 32.45 kg/m²     Physical Exam  Vitals and nursing note reviewed.   Constitutional:       General: She is not in acute distress.     Appearance: Normal appearance.   Cardiovascular:      Rate and Rhythm: Normal rate and regular rhythm.      Heart sounds: Normal heart sounds. No murmur heard.     Pulmonary:      Effort: " Pulmonary effort is normal. No respiratory distress.      Breath sounds: Normal breath sounds. No wheezing.   Abdominal:      General: Bowel sounds are normal. There is no distension.      Palpations: Abdomen is soft.      Tenderness: There is no abdominal tenderness. There is no right CVA tenderness or left CVA tenderness.   Skin:     General: Skin is warm and dry.      Findings: No rash.   Neurological:      Mental Status: She is alert.   Psychiatric:         Mood and Affect: Mood normal.         Behavior: Behavior normal.       Assessment / Plan      Assessment/Plan   Problem List Items Addressed This Visit     None      Visit Diagnoses     Dysuria    -  Primary    Relevant Orders    POCT urinalysis dipstick, automated (Completed)    Ct, Ng, Mycoplasmas SHOSHANA, Urine - Urine, Clean Catch    Urine Culture - Urine, Urine, Clean Catch  Brief Urine Lab Results  (Last result in the past 365 days)      Color   Clarity   Blood   Leuk Est   Nitrite   Protein   CREAT   Urine HCG        08/17/21 1558 Dark Yellow Cloudy 3+ Trace Negative Negative             Urine sent for culture we will hold on antibiotics at this time and treat as appropriate.    Acute vaginitis        Relevant Orders    POCT urinalysis dipstick, automated (Completed)    Ct, Ng, Mycoplasmas SHOSHANA, Urine - Urine, Clean Catch    Urine Culture - Urine, Urine, Clean Catch    BV and STD testing performed today.  Will treat as appropriate based upon results.  Will currently trial Diflucan for acute vaginitis, instructed  can repeat dose in 3 days if symptoms continue.  Encouraged increasing water intake, avoid baths, wipe front to back, void after sexual activity, and use cotton underwear.           Follow Up   Return if symptoms worsen or fail to improve.  Patient was given instructions and counseling regarding her condition or for health maintenance advice. Please see specific information pulled into the AVS if appropriate.     LEDY Berkowitz  [facility]  [facility]     Spouse

## 2021-08-24 NOTE — ED ADULT NURSE NOTE - DOES PATIENT HAVE ADVANCE DIRECTIVE
Pt is a 23 yo female who presents to the ED with multiple medical complaints. PMhx of anxiety, depression, bipolar disorder. Pt reports that she suffers from bipolar disorder and had been on Bupropion and Ativan but that she ran out of these medications 2 weeks ago. She reports that she use to follow up with psych out of [place] but that she moved to [place] several months ago. Prior to leaving [place] she was given a 4 month supply of medication but she reports that she ran into issues establishing care in [place] and ultimately recently moved back home. She reports that she ran out of medication 2 weeks ago. pt also reports that during that time she had her menstrual cycle which lasted 6 days. She states that she normally experiences heavy bleeding but that this cycle was heavy then usual and when she went to place a tampon she noted that she experienced severe lower abdominal and back pain. Pt has since engaged in penetrative intercourse without pain. She further reports that she has suffered from longstanding abdominal discomfort with associated nausea. pt reports that sometimes the nausea is so severe she will induce vomiting to relieve symptoms. She also reports that she alternates from periods of constipation and diarrhea. She has follow up with GI in the past and there was thought that she may be suffering from IBS she never followed up. Pt denies fever, chills, CP, SOB. She does report anxiety and is requesting to speak with psych. Denies SI/HI No Pt is a 21 yo female who presents to the ED with multiple medical complaints. PMhx of anxiety, depression, bipolar disorder. Pt reports that she suffers from bipolar disorder and had been on Bupropion and Ativan but that she ran out of these medications 2 weeks ago. She reports that she use to follow up with psych out of [place] but that she moved to [place] several months ago. Prior to leaving [place] she was given a 4 month supply of medication but she reports that she ran into issues establishing care in [place] and ultimately recently moved back home. She reports that she ran out of medication 2 weeks ago. pt also reports that during that time she had her menstrual cycle which lasted 6 days. She states that she normally experiences heavy bleeding but that this cycle was heavy then usual and when she went to place a tampon she noted that she experienced severe lower abdominal and back pain. Pt has since engaged in penetrative intercourse without pain. She further reports that she has suffered from longstanding abdominal discomfort with associated nausea. pt reports that sometimes the nausea is so severe she will induce vomiting to relieve symptoms. She also reports that she alternates from periods of constipation and diarrhea. She has follow up with GI in the past and there was thought that she may be suffering from IBS she never followed up. Pt denies fever, chills, CP, SOB. She does report anxiety and is requesting to speak with psych. Denies SI/HI Pt with worsening depression in the setting of running out o anti-depressants will obtain medical clearance and consult with psych. For abdominal complaints concern for GI pathology. Will obtain screening labs, CT abd/pelvis, and pelvic US. Will monitor

## 2022-08-12 NOTE — OCCUPATIONAL THERAPY INITIAL EVALUATION ADULT - KINESTHESIA,  LLE, OT EVAL
[de-identified] : rash [FreeTextEntry6] : rash on abdomen for past 2 weeks\par getting better\par red and bumpy, not itchy or painful\par nowhere else\par started minocycline for acne 2 weeks prior to onset of rash\par has been on minocycline in past with no issues\par no new detergents, bath products etc within normal limits

## 2022-10-18 PROBLEM — E05.00 THYROTOXICOSIS WITH DIFFUSE GOITER WITHOUT THYROTOXIC CRISIS OR STORM: Chronic | Status: ACTIVE | Noted: 2018-06-13

## 2022-10-18 PROBLEM — E78.5 HYPERLIPIDEMIA, UNSPECIFIED: Chronic | Status: ACTIVE | Noted: 2018-06-13

## 2022-10-18 PROBLEM — R01.1 CARDIAC MURMUR, UNSPECIFIED: Chronic | Status: ACTIVE | Noted: 2018-06-13

## 2022-11-08 ENCOUNTER — APPOINTMENT (OUTPATIENT)
Dept: ORTHOPEDIC SURGERY | Facility: CLINIC | Age: 77
End: 2022-11-08

## 2022-11-08 VITALS — HEIGHT: 64 IN | WEIGHT: 153 LBS | BODY MASS INDEX: 26.12 KG/M2

## 2022-11-08 DIAGNOSIS — E78.00 PURE HYPERCHOLESTEROLEMIA, UNSPECIFIED: ICD-10-CM

## 2022-11-08 DIAGNOSIS — Z78.9 OTHER SPECIFIED HEALTH STATUS: ICD-10-CM

## 2022-11-08 PROCEDURE — 73503 X-RAY EXAM HIP UNI 4/> VIEWS: CPT | Mod: LT

## 2022-11-08 PROCEDURE — 20552 NJX 1/MLT TRIGGER POINT 1/2: CPT

## 2022-11-08 PROCEDURE — 72100 X-RAY EXAM L-S SPINE 2/3 VWS: CPT

## 2022-11-08 PROCEDURE — 99214 OFFICE O/P EST MOD 30 MIN: CPT | Mod: 25

## 2022-11-08 PROCEDURE — J3490M: CUSTOM

## 2022-11-08 NOTE — IMAGING
[Facet arthropathy] : Facet arthropathy [Disc space narrowing] : Disc space narrowing [Scoliosis] : Scoliosis [Spondylolithesis] : Spondylolithesis [Left] : left hip with pelvis [de-identified] : LEFT HIP\par Well healed scars\par Sensation intact\par Motor 5/5 \par \par RIGHT HIP\par (-) Groin tenderness\par (-) Greater troch bursa tenderness\par +  Buttock tenderness \par Limited flexion, internal rotation\par NVI\par Mildly Antalgic gait w/o assistance\par \par BACK\par Right lumbar paraspinal tenderness . [FreeTextEntry9] : L hip components well fixed. Adv R hip OA with mild progression

## 2022-11-08 NOTE — HISTORY OF PRESENT ILLNESS
[8] : 8 [10] : 10 [Dull/Aching] : dull/aching [Constant] : constant [Household chores] : household chores [Leisure] : leisure [Sleep] : sleep [Ice] : ice [Walking] : walking [Lying in bed] : lying in bed [de-identified] : 11/8/22: 76yo F, known to me for L RASHEL 4 years ago, with right buttock and lumbar pain for the past 3 weeks with no traumatic injury. Admits to pain radiating down the right posterior leg. She has been doing well with the L hip. Naprosyn and MDP provided minimal relief. \par \par Previous doc:\par 2/6/18: 50% relief from 2nd left hip inj 2 weeks ago. Still some discomfort but tolerable.\par 7/13/18: 2 weeks s/p left RASHEL minimal pain. Completed PT. Had venous Doppler neg for DVT.\par 8/24 doing well min pain - happy \par 1/21/19 overall doing well but has pain if sits for long periods of time  [] : no [FreeTextEntry1] : Right hip [FreeTextEntry3] : 10/18/2022 [FreeTextEntry5] : AHMET ALBERT is a 77 year old F here for an evaluation of right hip pain. Pt states that she has no known injury to the hip, and that it started hurting three weeks to today. She has previously had a left hip replacement by Dr. Nugent in 2018.  [FreeTextEntry7] : Pain radiates into right groin [de-identified] : 10/20/2022 [de-identified] : PCP

## 2022-11-08 NOTE — PROCEDURE
[Trigger point 1-2 muscle groups] : trigger point 1-2 muscle groups [Right] : of the right [Gluteus Alfie muscle] : gluteus alfie muscle [Pain] : pain [Inflammation] : inflammation [Alcohol] : alcohol [Betadine] : betadine [Ethyl Chloride sprayed topically] : ethyl chloride sprayed topically [Sterile technique used] : sterile technique used [___ cc    3mg] :  Betamethasone (Celestone) ~Vcc of 3mg [___ cc    1%] : Lidocaine ~Vcc of 1%  [___ cc    0.25%] : Bupivacaine (Marcaine) ~Vcc of 0.25%  [] : Patient tolerated procedure well [Call if redness, pain or fever occur] : call if redness, pain or fever occur [Apply ice for 15min out of every hour for the next 12-24 hours as tolerated] : apply ice for 15 minutes out of every hour for the next 12-24 hours as tolerated [Risks, benefits, alternatives discussed / Verbal consent obtained] : the risks benefits, and alternatives have been discussed, and verbal consent was obtained

## 2022-11-08 NOTE — ASSESSMENT
[FreeTextEntry1] : Prev DOc:\par Mod/Adv OA both hips with petrusio - knee PT aggravated the hip - we will try hip inj to help decrease the inflammation and allow her to return PT for the knee- discussed RASHEL but I do not feel she is ready for that at this point she has similar OA on the rt with no sig symptoms\par 10/31 doing ok will start PT for hip and knee -\par 12/19/17: Worsening left hip pain after PT. Has some left knee pain as well with small meniscus tear but I feel her hip is her biggest issue. Had no relief from left knee inj in the past. Will try another left hip inj and monitor her knee symptoms after that. Starting to consider RASHEL.\par 2/6/18: Doing ok since recent hip inj. Discussed RASHEL when she is ready.\par 7/13/18: 2 weeks postop doing very well continue PT.\par 8/24 hip doing well some left knee still has some pain will add this to PT\par 1/21/19 doing wel some lateral bursitis but pain with long periods of sitting -\par \par 11/8/22: Lumbar radic, DDD, spondylolisthesis, and scoliosis. Adv R hip OA with mild progression from previous XR. She is having a combination of hip and back symptoms. Discussed anti-inflammatories, PT/HEP, IA hip CSI, TPI, and briefly RASHEL. She is well informed and would like to proceed with TPI. Will send her to Dr. Campbell for IA R hip CSI.

## 2022-11-08 NOTE — DISCUSSION/SUMMARY
[de-identified] : The patient was advised of the diagnosis.  The natural history of the pathology was explained in full to the patient in layman's terms. All questions were answered.  The risks and benefits of surgical and non-surgical treatment alternatives were explained in full to the patient.\par \par The natural progression of Osteoarthritis was explained to the patient.  We discussed the possible treatment options from conservative to operative.  These included NSAIDs, Glucosamine and Chondrotin sulfate, and Physical Therapy as well different types of injections.  We also discussed that at some point they may progress to needed a RASHEL.  Information and pamphlets were given when appropriate.\par \par The risks, benefits, contents and alternatives to injection were explained in full to the patient.  Risks outlined include but are not limited to infection, sepsis, bleeding, scarring, skin discoloration, temporary increase in pain, syncopal episode, failure to resolve symptoms, allergic reaction, flare reaction, permanent white skin discoloration, symptom recurrence, and elevation of blood sugar in diabetics.  Patient understood the risks.  All questions were answered.  After discussion of options, patient requested an injection.  Oral informed consent was obtained and sterile prep was done of the injection site.  Sterile technique was used to introduce the mixture.  Patient tolerated the procedure well.  Patient advised to ice the injection site this evening.  Signs and symptoms of infection reviewed and patient advised to call immediately for redness, fevers, and/or chills. \par \par Progress note completed by Debbie Olson PA-C.\par The documentation recorded by the PA accurately reflects the service I personally performed and the decisions made by me. -Dr. Nugent

## 2022-11-15 ENCOUNTER — APPOINTMENT (OUTPATIENT)
Dept: PAIN MANAGEMENT | Facility: CLINIC | Age: 77
End: 2022-11-15

## 2022-11-15 PROCEDURE — 73525 CONTRAST X-RAY OF HIP: CPT

## 2022-11-15 PROCEDURE — J3490N: CUSTOM

## 2022-11-15 PROCEDURE — 27095 INJECTION FOR HIP X-RAY: CPT | Mod: RT

## 2022-11-15 NOTE — PROCEDURE
[FreeTextEntry3] : Date of Service: 11/15/2022 \par \par Account: [id number]\par \par Patient: AHMET ALBERT \par \par YOB: 1945\par \par Age: 77 year\par \par \par Surgeon: Nalini Campbell M.D.\par \par Pre-Operative Diagnosis: Unilateral Primary Osteoarthritis , Right Hip (M16.11)\par \par Post Operative Diagnosis: Unilateral Primary Osteoarthritis , Right Hip (M16.11)\par \par Procedure: Right Hip arthrogram and steroid injection under fluoroscopic  guidance  \par \par                                                       \par This procedure was carried out using fluoroscopic guidance.  The risks and benefits of the procedure were discussed extensively with the patient.  The consent of the patient was obtained and the following procedure was performed.\par \par The patient was placed in the supine position with right hip flexed and externally rotated 25 degrees.  The area of the right groin was prepped and draped in a sterile fashion.  The fluoroscopic image intensifier was then positioned so that the right hip appeared in view, and the midline intertrochanteric region was identified and marked. The skin and subcutaneous structures were then anesthetized using 1 cc of 1% lidocaine.  A 22 gauge spinal needle was then inserted and directed into this right hip intra-capsular region.  After negative aspiration for heme and CSF,  3 cc of Omnipaque was injected and appeared to fill the joint  margins. \par \par Right hip arthrogram showed no intravascular flow, and good spread around the femoral head and to the acetabulum. An injectate of 3cc 0.25% marcaine plus 80 mg of Kenalog was then injected into the right hip space.\par \par The needle was then removed and pressure was applied.The patient was instructed to apply ice over the injection site for twenty minutes every two hours for the next 24 to 48 hours.  The patient was also instructed to contact me immediately if there were any problems.\par \par Nalini Campbell M.D.

## 2022-11-18 ENCOUNTER — APPOINTMENT (OUTPATIENT)
Dept: ORTHOPEDIC SURGERY | Facility: CLINIC | Age: 77
End: 2022-11-18

## 2022-11-29 ENCOUNTER — APPOINTMENT (OUTPATIENT)
Dept: PAIN MANAGEMENT | Facility: CLINIC | Age: 77
End: 2022-11-29

## 2022-12-06 ENCOUNTER — APPOINTMENT (OUTPATIENT)
Dept: ORTHOPEDIC SURGERY | Facility: CLINIC | Age: 77
End: 2022-12-06

## 2022-12-06 VITALS — WEIGHT: 153 LBS | HEIGHT: 64 IN | BODY MASS INDEX: 26.12 KG/M2

## 2022-12-06 DIAGNOSIS — Z96.642 PRESENCE OF LEFT ARTIFICIAL HIP JOINT: ICD-10-CM

## 2022-12-06 DIAGNOSIS — M16.11 UNILATERAL PRIMARY OSTEOARTHRITIS, RIGHT HIP: ICD-10-CM

## 2022-12-06 PROCEDURE — 99214 OFFICE O/P EST MOD 30 MIN: CPT

## 2022-12-06 NOTE — HISTORY OF PRESENT ILLNESS
[5] : 5 [Dull/Aching] : dull/aching [Radiating] : radiating [de-identified] : 12/6/22: Little relief from IA right hip injection. Having increasing pain in b/l legs equally. She feels the legs are heavy and has trouble with weakness/soreness in the legs. Especially in the morning. \par \par Previous doc:\par 2/6/18: 50% relief from 2nd left hip inj 2 weeks ago. Still some discomfort but tolerable.\par 7/13/18: 2 weeks s/p left RASHEL minimal pain. Completed PT. Had venous Doppler neg for DVT.\par 8/24 doing well min pain - happy \par 1/21/19 overall doing well but has pain if sits for long periods of time \par 11/8/22: 78yo F, known to me for L RASHEL 4 years ago, with right buttock and lumbar pain for the past 3 weeks with no traumatic injury. Admits to pain radiating down the right posterior leg. She has been doing well with the L hip. Naprosyn and MDP provided minimal relief.  [de-identified] : pain in the hip about the same and starting to get pain in the legs

## 2022-12-06 NOTE — IMAGING
[de-identified] : LEFT HIP\par Well healed scars\par Sensation intact\par Motor 5/5 \par \par RIGHT HIP\par (-) Groin tenderness\par (-) Greater troch bursa tenderness\par +  Buttock tenderness \par Limited flexion, internal rotation\par NVI\par Mildly Antalgic gait w/o assistance\par \par BACK\par Right lumbar paraspinal tenderness .

## 2022-12-06 NOTE — ASSESSMENT
[FreeTextEntry1] : Prev DOc:\par Mod/Adv OA both hips with petrusio - knee PT aggravated the hip - we will try hip inj to help decrease the inflammation and allow her to return PT for the knee- discussed RASHEL but I do not feel she is ready for that at this point she has similar OA on the rt with no sig symptoms\par 10/31 doing ok will start PT for hip and knee -\par 12/19/17: Worsening left hip pain after PT. Has some left knee pain as well with small meniscus tear but I feel her hip is her biggest issue. Had no relief from left knee inj in the past. Will try another left hip inj and monitor her knee symptoms after that. Starting to consider RASHEL.\par 2/6/18: Doing ok since recent hip inj. Discussed RASHEL when she is ready.\par 7/13/18: 2 weeks postop doing very well continue PT.\par 8/24 hip doing well some left knee still has some pain will add this to PT\par 1/21/19 doing wel some lateral bursitis but pain with long periods of sitting -\par 11/8/22: Lumbar radic, DDD, spondylolisthesis, and scoliosis. Adv R hip OA with mild progression from previous XR. She is having a combination of hip and back symptoms. Discussed anti-inflammatories, PT/HEP, IA hip CSI, TPI, and briefly RASHEL. She is well informed and would like to proceed with TPI. Will send her to Dr. Campbell for IA R hip CSI. \par \par 12/6/22: No relief from IA hip injection, anti-inflammatories or MDP from PCP. She will obtain an MRI of lspine to evaluate spinal stenosis for LESI planning. She does have a significant spondylolisthesis on XR. She will follow up with Dr. Campbell and start PT in the meantime.

## 2022-12-06 NOTE — DISCUSSION/SUMMARY
[de-identified] : The patient was advised of the diagnosis.  The natural history of the pathology was explained in full to the patient in layman's terms. All questions were answered.  The risks and benefits of surgical and non-surgical treatment alternatives were explained in full to the patient.\par \par Entered by Sabrina Rincon acting as scribe.\par

## 2022-12-07 ENCOUNTER — FORM ENCOUNTER (OUTPATIENT)
Age: 77
End: 2022-12-07

## 2022-12-08 ENCOUNTER — APPOINTMENT (OUTPATIENT)
Dept: MRI IMAGING | Facility: CLINIC | Age: 77
End: 2022-12-08

## 2022-12-08 PROCEDURE — 72148 MRI LUMBAR SPINE W/O DYE: CPT | Mod: MH

## 2022-12-19 NOTE — OCCUPATIONAL THERAPY INITIAL EVALUATION ADULT - PROPRIOCEPTION, LUE, OT EVAL
December 19, 2022       Maureen Bedolla, CNP  [address]  [facility] [address]  [address]      Patient: Stacie Au   YOB: 2022   Date of Visit: 12/19/2022       Dear Dr. Bedolla:    I saw your patient, Stacie Au, for an evaluation. Below are my notes for this visit with her.    If you have questions, please do not hesitate to call me.      Sincerely,        Cristofer Rajan MD        CC: No Recipients  Cristofer Rajan MD  12/19/2022  2:30 PM  Signed  Pediatric Neurology Consult Note    Stacie Au is a 32 month old female who is being evaluated via live interactive two-way video.    The patient has been informed that their consent to treat includes permission to submit claims to their insurance on their behalf for the services received.  Clinic Location: [address]    Patient Location: home  she was accompanied by Mother    Reason for use of telehealth: Minimize risk of potential exposure to viral respiratory illness during COVID-19 pandemic    This visit was conducted via telehealth as a precaution due to the Covid-19 pandemic.  A comprehensive physical exam could not be performed due to the limitations of telehealth.  Results should be interpreted accordingly.    Patient ID: Stacie is a 32 month old female.    Chief Complaint   Patient presents with   • Video Visit   • New Patient     At 7 months age seemed to be doing well.   Rolled over seemed startled and then she has shown weakness on the left.  Acting more like a 1 year old now. She prefers to go down one leg at a time. Left first and right as navigator.    This is noted on the left LE as being less utilized compared to right.  She never crawled- scooted mostly.  Physical therapist is working with her.  OT indicated does not need that.    Upper limbs the asymmetry is less noticeable but perhaps weaker on the left in UE as well, not convincingly though.  She may on occasion look only one  way.    No other developmental challenges. Social skills are good. Language development is good.  No regressions.  Febrile seizure 2021. Mother wonders if she had COVID infection at the time. Out of sorts and shaking at the time.Brief event few seconds. Mother is unsure if seizure related or not. Sibling has history of febrile seizure.  Walked at 19-20 months of age.      Stacie does not have any pertinent problems on file.  Stacie  has no past surgical history on file.  Her family history is not on file.  Birth History: full term, no complications  Stacie History reviewed. No pertinent past medical history.  Development: Rolling over at 7 months age but startled ad then stopped rolling over. Eventually walked at 19-20 months.    Fine motor skills on target  Speech advanced per mother.    PMH: RSV October 2022 but not hospitalized  PSH: Not contributory  Family: 2 older sisters. No neuro illness in family.    Stacie   No current outpatient medications on file.     No current facility-administered medications for this visit.     Stacie has no allergies on file.    Review of Systems  Review of Systems   Constitutional: Negative for chills and diaphoresis.   HENT: Negative for ear discharge, facial swelling and nosebleeds.    Eyes: Negative for discharge and redness.   Respiratory: Negative for apnea and stridor.    Cardiovascular: Negative for chest pain and cyanosis.   Gastrointestinal: Negative for abdominal distention and anal bleeding.   Endocrine: Negative for polydipsia and polyuria.   Genitourinary: Negative for dysuria and hematuria.   Musculoskeletal: Negative for joint swelling.   Skin: Negative for rash and wound.   Allergic/Immunologic: Negative for immunocompromised state.   Neurological: Negative for facial asymmetry.   Hematological: Does not bruise/bleed easily.   Psychiatric/Behavioral: Negative for hallucinations and self-injury.         There were no vitals taken for this visit.     Physical  Exam  Physical Exam     Neurologic Exam     Patient was unavailable for comprehensive physical/neurological examination during this telehealth assessment    Problem List Items Addressed This Visit        Musculoskeletal and Injuries    Left-sided muscle weakness - Primary     Her clinical presentation is consistent with left-sided weakness most noticeable in the lower extremity as compared to the upper extremity.  She has made good ongoing progress and the asymmetry is less evident now.  Nonetheless this basically is not resolved.    I wonder if this is related to a structural pathology in the brain which could be in the form of a stroke or congenital anomaly of the brain.  Alternatively some asymmetry may be noted without any structural abnormalities also.    Consequently I have discussed the following options  1.  We can do a more comprehensive in person assessment looking for tone, power, reflexes as well as the rest of the complete neurological examination in order to determine next steps.  2.  We can do an in person assessment but also proceed with an MRI brain since the asymmetry has not yet resolved as noted by the family.    Family is comfortable with option 1, starting with an in person comprehensive assessment.    I have discussed the nature of her condition, course, prognosis, plan, recommendations and outcome extensively with the family.    Family is comfortable with the recommendations as outlined.    They will call us with a progress report as discussed.  Additionally I have advised them to call us with any neurological changes.    Return in about 4 weeks (around 1/16/2023).    Copy visit note to referring physician.     I spent a total of 80 minutes on the day of the visit.  This includes pre-charting, chart review, documenting and referring/communicating with other health care professionals.    This visit was conducted via interactive video telehealth as a precaution due to the Covid-19 pandemic.  A  comprehensive physical exam could not be performed due to the limitations of telehealth.  Results should be interpreted accordingly.    Cristofer Rajan MD  12/19/2022     within normal limits

## 2022-12-23 ENCOUNTER — APPOINTMENT (OUTPATIENT)
Dept: PAIN MANAGEMENT | Facility: CLINIC | Age: 77
End: 2022-12-23
Payer: MEDICARE

## 2022-12-23 VITALS — HEIGHT: 68 IN | WEIGHT: 200 LBS | BODY MASS INDEX: 30.31 KG/M2

## 2022-12-23 VITALS — WEIGHT: 200 LBS | HEIGHT: 68 IN | BODY MASS INDEX: 30.31 KG/M2

## 2022-12-23 PROCEDURE — 99204 OFFICE O/P NEW MOD 45 MIN: CPT

## 2022-12-23 PROCEDURE — 99214 OFFICE O/P EST MOD 30 MIN: CPT

## 2022-12-23 NOTE — PHYSICAL EXAM
[] : SI joint tenderness [NL (90)] : forward flexion 90 degrees [4___] : right dorsiflexors 4[unfilled]/5 [de-identified] : extension 20 degrees

## 2022-12-23 NOTE — HISTORY OF PRESENT ILLNESS
[Lower back] : lower back [Dull/Aching] : dull/aching [Radiating] : radiating [Rest] : rest [6] : 6 [4] : 4 [Constant] : constant [Ice] : ice [Physical therapy] : physical therapy [Injection therapy] : injection therapy [Standing] : standing [Walking] : walking [Retired] : Work status: retired [FreeTextEntry1] : 12/23/2022 : Evaluation today for b/l leg pain. Pain worse in the posterior legs. intermittent  numbness. Intermittent weakness. Had a hip injection with limited relief. She had started PT, however pain had got worse and wanted it better controlled. \par \par Subjective Weakness: Yes/No\par Numbness/Tingling: Yes/No\par Bladder/Bowel dysfunction: Yes/No\par Physical Therapy:\par \par \par Attempted modalities for current pain complaint:\par See above:\par Medications: No\par \par Injections: Yes\par right hip injection (11/15/22) \par \par Previous Spine Surgery: N/A\par \par Imaging:\par MRI Lumbar Spine (12/8/22)2. Hemangioma at L3 with no fracture.\par 3. T11-T12: Loss of disc signal and height with anterior spurring and bulging. Modic type 2 endplate change. \par Minor retrolisthesis. Broad posterior bulge and spondylotic ridge asymmetric to the right impressing on the \par thecal sac with extension into the right foramen and right sided canal stenosis with right foraminal stenosis.\par 4. T12-L1: Loss of disc signal. Facet arthrosis.\par 5. L1-L2: Loss of disc signal. Facet arthrosis.\par 6. L2-L3: Loss of disc signal. Minor retrolisthesis. Broad bulge, facet arthrosis and ligamentum flavum \par hypertrophy with right foraminal herniation and foraminal stenosis right greater than left.\par 7. L3-L4: Grade 1 anterior spondylolisthesis. Broad bulge, facet arthrosis and ligamentum flavum hypertrophy\par and facet effusion left greater than right. Inferior foraminal stenosis. Broad herniation superimposed on the \par bulge with mild central stenosis.\par 8. L4-L5: Grade 1 anterior spondylolisthesis. Loss of disc signal and height with Modic type 2 endplate change. \par Vacuum disc. Bulge, spondylotic ridge and facet arthrosis with ligamentum flavum hypertrophy right greater \par than left with facet effusions. Foraminal stenosis right greater than left. Broad bulge and spondylotic ridge\par impressing on the thecal sac with moderate central stenosis.\par 9. L5-S1: Loss of disc signal and height. Vacuum disc. Grade 1 anterior spondylolisthesis. Broad bulge, facet \par arthrosis, ligamentum flavum hypertrophy and facet effusion with foraminal extension of bulge with right\par foraminal herniation and foraminal stenosis right greater than left. Broad bulge contacts the S1 nerve roots. No \par central stenosis.\par 10. Parapelvic cyst at the left kidney. Ultrasound suggested for confirmation. \par   [] : no [FreeTextEntry7] : b/l back pain  down to the thighs  [de-identified] : MRI lumbar 12/8/22

## 2022-12-23 NOTE — ASSESSMENT
[FreeTextEntry1] : After discussing various treatment options with the patient including but not limited to oral medications, physical therapy, exercise, modalities as well as interventional spinal injections, we have decided with the following plan:\par \par 1) Intervention Injection Therapy:\par I personally reviewed the MRI/CT scan images and agree with the radiologist's report. The radiological findings were discussed with the patient.\par The risks, benefits, contents and alternatives to injection were explained in full to the patient. Risks outlined include but are not limited to infection,sepsis, bleeding, post-dural puncture headache, nerve damage, temporary increase in pain, syncopal episode, failure to resolve symptoms, allergic reaction, symptom recurrence, and elevation of blood sugar in diabetics. Cortisone may cause immunosuppression. Patient understands the risks. All questions were answered. After discussion of options, patient requested an injection. Information regarding the injection was given to the patient. Which medications to stop prior to the injection was explained to the patient as well.\par \par Follow up in 1-2 weeks post injection for re-evaluation. \par Continue Home exercises, stretching, activity modification, physical therapy, and conservative care.\par Patient is presenting with acute/sub-acute radicular pain with impairment in ADLs and functionality.  The pain has not responded to  conservative care including nsaid therapy and/or physical therapy.  There is no bleeding tendency, unstable medical condition, or systemic infection. \par \par LESI L5/S1- if no relief consider MBB"s. \par \par \par \par

## 2023-01-20 ENCOUNTER — APPOINTMENT (OUTPATIENT)
Dept: PAIN MANAGEMENT | Facility: CLINIC | Age: 78
End: 2023-01-20
Payer: MEDICARE

## 2023-01-20 PROCEDURE — 62323 NJX INTERLAMINAR LMBR/SAC: CPT

## 2023-01-20 NOTE — PROCEDURE
[FreeTextEntry3] : Date of Service: 01/20/2023 \par \par Account: [id number]\par \par Patient: AHMET ALBERT \par \par YOB: 1945\par \par Age: 77 year\par \par \par Surgeon:                                               Nalini Campbell M.D.\par \par Pre-Operative Diagnosis:                   Lumbosacral Radiculitis (M54.17)     \par \par Post Operative Diagnosis:                 Lumbosacral Radiculitis (M54.17)     \par \par Procedure:                                           Interlaminar lumbar epidural steroid injection (L5-S1) under fluoroscopic guidance\par  \par Anesthesia:                MAC\par \par \par This procedure was carried out using fluoroscopic guidance.  The risks and benefits of the procedure were discussed extensively with the patient.  The consent of the patient was obtained and the following procedure was performed. \par \par The patient was placed in the prone position.  The lumbar area was prepped and draped in a sterile fashion.  Under AP view with slight cephalad-caudad angulation, the L5-S1 interspace was identified and marked.  Using sterile technique the superficial skin was anesthetized with 1% Lidocaine without epinephrine.  A 20 gauge Tuohoy needle was advanced into the epidural space under fluoroscopy using [id number] technique and using loss of resistance at the L5-S1 level.  After negative aspiration for heme or CSF, an epidurogram was obtained using 3 cc Omnipaque contrast confirming epidural placement of the needle.  \par \par Epidurogram showed no evidence of intrathecal or intravascular flow, and good evidence of bilateral epidural flow from L3-S2 levels.  After this, 5 cc of preservative free normal saline and 80 mg of Kenalog were injected into the epidural space.\par \par The needle was subsequently removed.  Anesthesia personnel were present throughout the procedure.\par \par The patient tolerated the procedure well and was instructed to contact me immediately if there were any problems.\par \par Nalini Campbell M.D.\par \par \par

## 2023-02-10 ENCOUNTER — APPOINTMENT (OUTPATIENT)
Dept: PAIN MANAGEMENT | Facility: CLINIC | Age: 78
End: 2023-02-10
Payer: MEDICARE

## 2023-02-10 VITALS — HEIGHT: 68 IN | BODY MASS INDEX: 30.31 KG/M2 | WEIGHT: 200 LBS

## 2023-02-10 PROCEDURE — 99214 OFFICE O/P EST MOD 30 MIN: CPT

## 2023-02-10 NOTE — ASSESSMENT
[FreeTextEntry1] : After discussing various treatment options with the patient including but not limited to oral medications, physical therapy, exercise, modalities as well as interventional spinal injections, we have decided with the following plan:\par \par 1) Intervention Injection Therapy:\par I personally reviewed the MRI/CT scan images and agree with the radiologist's report. The radiological findings were discussed with the patient.\par The risks, benefits, contents and alternatives to injection were explained in full to the patient. Risks outlined include but are not limited to infection,sepsis, bleeding, post-dural puncture headache, nerve damage, temporary increase in pain, syncopal episode, failure to resolve symptoms, allergic reaction, symptom recurrence, and elevation of blood sugar in diabetics. Cortisone may cause immunosuppression. Patient understands the risks. All questions were answered. After discussion of options, patient requested an injection. Information regarding the injection was given to the patient. Which medications to stop prior to the injection was explained to the patient as well.\par \par Follow up in 1-2 weeks post injection for re-evaluation. \par Continue Home exercises, stretching, activity modification, physical therapy, and conservative care.\par Patient is presenting with acute/sub-acute radicular pain with impairment in ADLs and functionality.  The pain has not responded to  conservative care including nsaid therapy and/or physical therapy.  There is no bleeding tendency, unstable medical condition, or systemic infection. \par \par B/L L5-S1 TFESI

## 2023-02-10 NOTE — PHYSICAL EXAM
[NL (90)] : forward flexion 90 degrees [4___] : right dorsiflexors 4[unfilled]/5 [] : no palpable masses [de-identified] : extension 20 degrees

## 2023-02-10 NOTE — HISTORY OF PRESENT ILLNESS
[Lower back] : lower back [Dull/Aching] : dull/aching [Radiating] : radiating [Rest] : rest [Ice] : ice [Physical therapy] : physical therapy [Injection therapy] : injection therapy [Standing] : standing [Walking] : walking [Retired] : Work status: retired [4] : 4 [2] : 2 [Intermittent] : intermittent [FreeTextEntry1] : 02/10/23: follow up today LESI on 1/20/23 with 50% relief.  Still has pain in back of both thighs.  Will hold off on repeat injection.  \par \par 12/23/2022 : Evaluation today for b/l leg pain. Pain worse in the posterior legs. intermittent  numbness. Intermittent weakness. Had a hip injection with limited relief. She had started PT, however pain had got worse and wanted it better controlled. \par \par Subjective Weakness: Yes/No\par Numbness/Tingling: Yes/No\par Bladder/Bowel dysfunction: Yes/No\par Physical Therapy:\par \par \par Attempted modalities for current pain complaint:\par See above:\par Medications: No\par \par Injections: Yes\par right hip injection (11/15/22)\par LESI L5-S1 1/20/23 \par \par Previous Spine Surgery: N/A\par \par Imaging:\par MRI Lumbar Spine (12/8/22)2. Hemangioma at L3 with no fracture.\par 3. T11-T12: Loss of disc signal and height with anterior spurring and bulging. Modic type 2 endplate change. \par Minor retrolisthesis. Broad posterior bulge and spondylotic ridge asymmetric to the right impressing on the \par thecal sac with extension into the right foramen and right sided canal stenosis with right foraminal stenosis.\par 4. T12-L1: Loss of disc signal. Facet arthrosis.\par 5. L1-L2: Loss of disc signal. Facet arthrosis.\par 6. L2-L3: Loss of disc signal. Minor retrolisthesis. Broad bulge, facet arthrosis and ligamentum flavum \par hypertrophy with right foraminal herniation and foraminal stenosis right greater than left.\par 7. L3-L4: Grade 1 anterior spondylolisthesis. Broad bulge, facet arthrosis and ligamentum flavum hypertrophy\par and facet effusion left greater than right. Inferior foraminal stenosis. Broad herniation superimposed on the \par bulge with mild central stenosis.\par 8. L4-L5: Grade 1 anterior spondylolisthesis. Loss of disc signal and height with Modic type 2 endplate change. \par Vacuum disc. Bulge, spondylotic ridge and facet arthrosis with ligamentum flavum hypertrophy right greater \par than left with facet effusions. Foraminal stenosis right greater than left. Broad bulge and spondylotic ridge\par impressing on the thecal sac with moderate central stenosis.\par 9. L5-S1: Loss of disc signal and height. Vacuum disc. Grade 1 anterior spondylolisthesis. Broad bulge, facet \par arthrosis, ligamentum flavum hypertrophy and facet effusion with foraminal extension of bulge with right\par foraminal herniation and foraminal stenosis right greater than left. Broad bulge contacts the S1 nerve roots. No \par central stenosis.\par 10. Parapelvic cyst at the left kidney. Ultrasound suggested for confirmation. \par   [] : no [FreeTextEntry7] : b/l back pain  down to the thighs  [de-identified] : MRI lumbar 12/8/22

## 2023-03-15 ENCOUNTER — NON-APPOINTMENT (OUTPATIENT)
Age: 78
End: 2023-03-15

## 2023-05-31 ENCOUNTER — APPOINTMENT (OUTPATIENT)
Dept: ORTHOPEDIC SURGERY | Facility: CLINIC | Age: 78
End: 2023-05-31
Payer: MEDICARE

## 2023-05-31 VITALS — BODY MASS INDEX: 23.19 KG/M2 | WEIGHT: 153 LBS | HEIGHT: 68 IN

## 2023-05-31 DIAGNOSIS — M65.311 TRIGGER THUMB, RIGHT THUMB: ICD-10-CM

## 2023-05-31 DIAGNOSIS — Z00.00 ENCOUNTER FOR GENERAL ADULT MEDICAL EXAMINATION W/OUT ABNORMAL FINDINGS: ICD-10-CM

## 2023-05-31 DIAGNOSIS — Z86.39 PERSONAL HISTORY OF OTHER ENDOCRINE, NUTRITIONAL AND METABOLIC DISEASE: ICD-10-CM

## 2023-05-31 PROCEDURE — 20550 NJX 1 TENDON SHEATH/LIGAMENT: CPT | Mod: RT

## 2023-05-31 PROCEDURE — 99214 OFFICE O/P EST MOD 30 MIN: CPT | Mod: 25

## 2023-05-31 PROCEDURE — 73140 X-RAY EXAM OF FINGER(S): CPT | Mod: RT

## 2023-05-31 RX ORDER — LEVOTHYROXINE SODIUM 137 UG/1
TABLET ORAL
Refills: 0 | Status: ACTIVE | COMMUNITY

## 2023-05-31 NOTE — IMAGING
[de-identified] : right thumb TTP a1 pulley\par +triggering\par otherwise farom\par neurovascular intact distally\par  [Right] : right fingers [There are no fractures, subluxations or dislocations. No significant abnormalities are seen] : There are no fractures, subluxations or dislocations. No significant abnormalities are seen

## 2023-05-31 NOTE — HISTORY OF PRESENT ILLNESS
[Localized] : localized [de-identified] : 5/31/23:  Pt's left thumb started locking last night. [] : no [FreeTextEntry1] : right thumb [FreeTextEntry5] : patient noticed that she her thumb started to lock while she was asleep

## 2023-06-08 ENCOUNTER — APPOINTMENT (OUTPATIENT)
Dept: ORTHOPEDIC SURGERY | Facility: CLINIC | Age: 78
End: 2023-06-08
Payer: MEDICARE

## 2023-06-08 VITALS — WEIGHT: 153 LBS | HEIGHT: 68 IN | BODY MASS INDEX: 23.19 KG/M2

## 2023-06-08 DIAGNOSIS — M43.16 SPONDYLOLISTHESIS, LUMBAR REGION: ICD-10-CM

## 2023-06-08 PROCEDURE — 99214 OFFICE O/P EST MOD 30 MIN: CPT

## 2023-06-08 NOTE — IMAGING
[de-identified] : LSPINE\par Palpation: lumbar midline tenderness \par ROM: Full with no pain\par Motor: no focal deficit \par Strength: 5/5 bilateral hip flexors, knee extensors, ankle dorsiflexors, EHL, ankle plantarflexors\par Sensation I LT \par - SLR B/L \par Toe and heal walking intact \par Gait antalgic \par

## 2023-06-08 NOTE — DATA REVIEWED
[MRI] : MRI [Report was reviewed and noted in the chart] : The report was reviewed and noted in the chart [I independently reviewed and interpreted images and report] : I independently reviewed and interpreted images and report [I reviewed the films/CD] : I reviewed the films/CD [FreeTextEntry1] : L3 change of signal \par L4-L5 spondylolisthesis b/l foraminal stenosis R>L \par

## 2023-06-08 NOTE — HISTORY OF PRESENT ILLNESS
[Lower back] : lower back [Sudden] : sudden [10] : 10 [Dull/Aching] : dull/aching [Radiating] : radiating [Constant] : constant [Nothing helps with pain getting better] : Nothing helps with pain getting better [de-identified] : 6/8/2023: lower back pain since October; no specific injury; pain radiates to B/L LE to foot. Had 1 epidural in january with some relef for 2 months. Completed 40 sessions of PT with mild relief. Nsaids help but discontinued due to side effects.  [] : no [FreeTextEntry5] : AHMET ALBERT is a 77 yo F here for low back paint that shoots down b/l legs to feet. Started 10/2022. Had 1 epidural 01/2023; tried PT- no has helped. Octaviano Nugent; MRI L Spine completed.  [FreeTextEntry7] : b/l legs [de-identified] : MRI L SPINE

## 2023-06-08 NOTE — ASSESSMENT
[FreeTextEntry1] : LS MRI: L4-5 spondylolisthesis with b/l foraminal stenosis R>L. Katherine has started treatment with PT, meds and 1LESI, her case would benefit from surgical intervention but  first will exhaust non surgical care.,Will refer to pain management for 2nd LESI in L4-5. F/up in 4-6 weeks to eval improvement with LESI. F/up if symptoms dictate. \par IF surgery is needed then two level XLIF due to degen scoli and central PLUS foraminal stenosis

## 2023-06-28 ENCOUNTER — APPOINTMENT (OUTPATIENT)
Dept: ORTHOPEDIC SURGERY | Facility: CLINIC | Age: 78
End: 2023-06-28

## 2023-07-06 ENCOUNTER — APPOINTMENT (OUTPATIENT)
Dept: PAIN MANAGEMENT | Facility: CLINIC | Age: 78
End: 2023-07-06

## 2023-07-20 ENCOUNTER — APPOINTMENT (OUTPATIENT)
Dept: PAIN MANAGEMENT | Facility: CLINIC | Age: 78
End: 2023-07-20

## 2023-08-21 ENCOUNTER — APPOINTMENT (OUTPATIENT)
Dept: PAIN MANAGEMENT | Facility: CLINIC | Age: 78
End: 2023-08-21
Payer: MEDICARE

## 2023-08-21 PROCEDURE — 64483 NJX AA&/STRD TFRM EPI L/S 1: CPT | Mod: RT

## 2023-08-21 NOTE — PROCEDURE
[FreeTextEntry3] : Date of Service: 08/21/2023   Account: [id number]  Patient: AHMET ALBERT   YOB: 1945  Age: 78 year   Surgeon:                                                        Nalini Campbell M.D.  Assistant:                                                        None.  Pre-Operative Diagnosis:                            Lumbosacral Radiculitis (M54.17)  Post Operative Diagnosis:                          Lumbosacral Radiculitis (M54.17)  Procedure:                                                     Right L5-S1 transforaminal epidural steroid injection                                                                          Left L5-S1 transforaminal epidural steroid injection under fluoroscopic guidance.  Anesthesia:                                                    None   This procedure was carried out using fluoroscopic guidance.  The risks and benefits of the procedure were discussed extensively with the patient.  The consent of the patient was obtained and the following procedure was performed. The patient was placed in the prone position on the fluoroscopic table and the lumbar area was prepped and draped in a sterile fashion.  The left L5-S1 neural foramen was then identified on right oblique  "wilmer dog" anatomical view at the 6 o' clock position using fluoroscopic guidance, and the area was marked. The overlying skin and subcutaneous structures were anesthetized using sterile technique with 1% Lidocaine.  A 22 gauge spinal needle was directed toward the inferior (6 o'clock) position of the pedicle, which formed the roof of the identified foramen.  Once in the epidural space, after negative aspiration for heme and CSF, 1cc of Omnipaque contrast was injected to confirm epidural location and assess filling defects and rule out intravascular needle placement.  The following contrast flow and epidurogram was observed: no intravascular or intrathecal flow pattern was noted.  No blood or CSF was aspirated. Omnipaque spread appeared to outline the left L5 nerve root and spread medially into the epidural space.    After this, an injectate of 3 cc preservative free normal saline plus 40 mg of Kenalog was injected in the epidural space.  The right L5-S1 neural foramen was then identified on right oblique  "wilmer dog" anatomical view at the 6 o' clock position using fluoroscopic guidance, and the area was marked. The overlying skin and subcutaneous structures were anesthetized using sterile technique with 1% Lidocaine.  A 22 gauge spinal needle was directed toward the inferior (6 o'clock) position of the pedicle, which formed the roof of the identified foramen.  Once in the epidural space, after negative aspiration for heme and CSF, 1cc of Omnipaque contrast was injected to confirm epidural location and assess filling defects and rule out intravascular needle placement.   The following contrast flow and epidurogram was observed: no intravascular or intrathecal flow pattern was noted.  No blood or CSF was aspirated. Omnipaque spread appeared to outline the right L5 nerve root and spread medially into the epidural space.    After this, an injectate of 3 cc preservative free normal saline plus 40 mg of Kenalog was injected in the epidural space.  The needle was subsequently removed.  Vital signs remained normal.  Pulse oximeter was used throughout the procedure and the patient's pulse and oxygen saturation remained within normal limits.  The patient tolerated the procedure well.  There were no complications.  The patient was instructed to apply ice over the injection sites for twenty minutes every two hours for the next 24 to 48 hours.  The patient was also instructed to contact me immediately if there were any problems.  Nalini Campbell M.D.

## 2023-09-07 ENCOUNTER — APPOINTMENT (OUTPATIENT)
Dept: PAIN MANAGEMENT | Facility: CLINIC | Age: 78
End: 2023-09-07
Payer: MEDICARE

## 2023-09-07 VITALS — BODY MASS INDEX: 26.12 KG/M2 | HEIGHT: 64 IN | WEIGHT: 153 LBS

## 2023-09-07 DIAGNOSIS — M51.36 OTHER INTERVERTEBRAL DISC DEGENERATION, LUMBAR REGION: ICD-10-CM

## 2023-09-07 DIAGNOSIS — M51.34 OTHER INTERVERTEBRAL DISC DEGENERATION, THORACIC REGION: ICD-10-CM

## 2023-09-07 PROCEDURE — 99214 OFFICE O/P EST MOD 30 MIN: CPT

## 2023-09-07 RX ORDER — DULOXETINE HYDROCHLORIDE 20 MG/1
20 CAPSULE, DELAYED RELEASE PELLETS ORAL
Qty: 60 | Refills: 0 | Status: ACTIVE | COMMUNITY
Start: 2023-09-07 | End: 1900-01-01

## 2023-09-07 NOTE — HISTORY OF PRESENT ILLNESS
[Lower back] : lower back [6] : 6 [4] : 4 [Dull/Aching] : dull/aching [Radiating] : radiating [Constant] : constant [Rest] : rest [Ice] : ice [Physical therapy] : physical therapy [Injection therapy] : injection therapy [Standing] : standing [Walking] : walking [Retired] : Work status: retired [FreeTextEntry1] : 09/07/2023: follow up today for B/L L5-S1 TFESI on 8/21 50% relief.  will start cymbalta  BID.  12/23/2022 : Evaluation today for b/l leg pain. Pain worse in the posterior legs. intermittent  numbness. Intermittent weakness. Had a hip injection with limited relief. She had started PT, however pain had got worse and wanted it better controlled.   Subjective Weakness: Yes/No Numbness/Tingling: Yes/No Bladder/Bowel dysfunction: Yes/No Physical Therapy:   Attempted modalities for current pain complaint: See above: Medications: No  Injections: Yes right hip injection (11/15/22)  B/L L5-S1 TFEI 8/21/23 Previous Spine Surgery: N/A  Imaging: MRI Lumbar Spine (12/8/22)2. Hemangioma at L3 with no fracture. 3. T11-T12: Loss of disc signal and height with anterior spurring and bulging. Modic type 2 endplate change.  Minor retrolisthesis. Broad posterior bulge and spondylotic ridge asymmetric to the right impressing on the  thecal sac with extension into the right foramen and right sided canal stenosis with right foraminal stenosis. 4. T12-L1: Loss of disc signal. Facet arthrosis. 5. L1-L2: Loss of disc signal. Facet arthrosis. 6. L2-L3: Loss of disc signal. Minor retrolisthesis. Broad bulge, facet arthrosis and ligamentum flavum  hypertrophy with right foraminal herniation and foraminal stenosis right greater than left. 7. L3-L4: Grade 1 anterior spondylolisthesis. Broad bulge, facet arthrosis and ligamentum flavum hypertrophy and facet effusion left greater than right. Inferior foraminal stenosis. Broad herniation superimposed on the  bulge with mild central stenosis. 8. L4-L5: Grade 1 anterior spondylolisthesis. Loss of disc signal and height with Modic type 2 endplate change.  Vacuum disc. Bulge, spondylotic ridge and facet arthrosis with ligamentum flavum hypertrophy right greater  than left with facet effusions. Foraminal stenosis right greater than left. Broad bulge and spondylotic ridge impressing on the thecal sac with moderate central stenosis. 9. L5-S1: Loss of disc signal and height. Vacuum disc. Grade 1 anterior spondylolisthesis. Broad bulge, facet  arthrosis, ligamentum flavum hypertrophy and facet effusion with foraminal extension of bulge with right foraminal herniation and foraminal stenosis right greater than left. Broad bulge contacts the S1 nerve roots. No  central stenosis. 10. Parapelvic cyst at the left kidney. Ultrasound suggested for confirmation.    [] : no [FreeTextEntry7] : b/l back pain  down to the thighs  [de-identified] : MRI lumbar 12/8/22

## 2023-09-07 NOTE — PHYSICAL EXAM
[NL (90)] : forward flexion 90 degrees [4___] : right dorsiflexors 4[unfilled]/5 [] : no palpable masses [de-identified] : extension 20 degrees

## 2023-10-19 ENCOUNTER — APPOINTMENT (OUTPATIENT)
Dept: PAIN MANAGEMENT | Facility: CLINIC | Age: 78
End: 2023-10-19
Payer: MEDICARE

## 2023-10-19 DIAGNOSIS — H35.3131 NONEXUDATIVE AGE-RELATED MACULAR DEGENERATION, BILATERAL, EARLY DRY STAGE: ICD-10-CM

## 2023-10-19 PROCEDURE — 99214 OFFICE O/P EST MOD 30 MIN: CPT

## 2023-12-28 ENCOUNTER — APPOINTMENT (OUTPATIENT)
Dept: PAIN MANAGEMENT | Facility: CLINIC | Age: 78
End: 2023-12-28
Payer: MEDICARE

## 2023-12-28 PROCEDURE — 62323 NJX INTERLAMINAR LMBR/SAC: CPT

## 2023-12-28 NOTE — PROCEDURE
[FreeTextEntry3] : Date of Service: 12/28/2023   Account: [id number]  Patient: AHMET ALBERT   YOB: 1945  Age: 78 year   Surgeon:      Nalini Campbell M.D.  Pre-Operative Diagnosis:                              Lumbar Radiculitis (54.17)  Post Operative Diagnosis:                            Same  Procedure:                                                       Caudal epidural steroid injection  under fluoroscopic guidance  Anesthesia:                                                      None   This procedure was carried out using fluoroscopic guidance.  The risks and benefits of the procedure were discussed extensively with the patient.  Risks included infection, bleeding, epidural hematoma, nerve damage, and post-dural puncture headache.  The consent of the patient was obtained and the following procedure was performed.  The patient was placed in the prone position using a pillow under the abdomen to reduce the lumbar lordosis.  The lumbo-sacral area was prepped and draped in a sterile fashion.  Using fluoroscopic guidance the sacrum was visualized using a lateral view.   Using sterile technique the superficial skin was anesthetized with 1.5% Lidocaine without epinephrine.  A 22 inch spinal needle was advanced under fluoroscopy using [id number] technique until the sacral-coccygeal ligament was engaged and penetrated.  After confirmation of needle placement in the epidural space, the needle was advanced to approximately the S3 level.    After negative aspiration for heme and CSF, 3 cc of Omnipaque confirmed good lumbar epiduragram.  An AP view was used to confirm dye spread.  An injectate of 10 cc of preservative free normal saline, 0.25% marcaine and 1% Lidocaine plus 80 mg of Kenalog was then injected into the epidural space. The needle was subsequently removed and pressure was applied.  The patient tolerated the procedure well without any complications.  Vitals signs remained within normal limits throughout the procedure.  The patient was instructed to apply ice to the area for approximately 20 minutes 3-4  times for the next 24 hours.  The patient was also instructed to contact me immediately if there were any problems.  Nalini Campbell M.D.

## 2024-01-11 ENCOUNTER — APPOINTMENT (OUTPATIENT)
Dept: PAIN MANAGEMENT | Facility: CLINIC | Age: 79
End: 2024-01-11
Payer: MEDICARE

## 2024-01-11 VITALS — BODY MASS INDEX: 26.12 KG/M2 | HEIGHT: 64 IN | WEIGHT: 153 LBS

## 2024-01-11 DIAGNOSIS — M54.16 RADICULOPATHY, LUMBAR REGION: ICD-10-CM

## 2024-01-11 DIAGNOSIS — M54.50 LOW BACK PAIN, UNSPECIFIED: ICD-10-CM

## 2024-01-11 PROCEDURE — 99214 OFFICE O/P EST MOD 30 MIN: CPT

## 2024-01-11 NOTE — PHYSICAL EXAM
[NL (90)] : forward flexion 90 degrees [4___] : right dorsiflexors 4[unfilled]/5 [] : no palpable masses [de-identified] : extension 20 degrees

## 2024-01-11 NOTE — HISTORY OF PRESENT ILLNESS
[Lower back] : lower back [9] : 9 [Dull/Aching] : dull/aching [Radiating] : radiating [Sharp] : sharp [Constant] : constant [Rest] : rest [Ice] : ice [Physical therapy] : physical therapy [Injection therapy] : injection therapy [Standing] : standing [Walking] : walking [Retired] : Work status: retired [5] : 5 [FreeTextEntry1] : 01/11/2024: follow up today for caudal on 12/28 with 40% relief  10/19/2023: follow up today for lower back pain.  she did not start the Cymbalta as it states it can cause macular degeneration with continued use.  Pain in the back down both legs. numbness in the right leg. worse in the shin. She has been going to PT. She tripped and fell overnight and tore the skin on the right leg. (has only had 3  sessions of PT so far) acupuncture is not working.   can not take NSAIDS due to macular degeneration.   09/07/2023: follow up today for B/L L5-S1 TFESI on 8/21 50% relief.  will start cymbalta  BID.  12/23/2022 : Evaluation today for b/l leg pain. Pain worse in the posterior legs. intermittent numbness. Intermittent weakness. Had a hip injection with limited relief. She had started PT, however pain had got worse and wanted it better controlled.   Subjective Weakness: Yes Numbness/Tingling: Yes Bladder/Bowel dysfunction: No Physical Therapy: Current    Attempted modalities for current pain complaint: See above: Medications: No  Injections: Yes right hip injection (11/15/22)  B/L L5-S1 TFESI 8/21/23 caudal 12/28/23 Previous Spine Surgery: N/A  Imaging: MRI Lumbar Spine (12/8/22)2. Hemangioma at L3 with no fracture. 3. T11-T12: Loss of disc signal and height with anterior spurring and bulging. Modic type 2 endplate change.  Minor retrolisthesis. Broad posterior bulge and spondylotic ridge asymmetric to the right impressing on the  thecal sac with extension into the right foramen and right sided canal stenosis with right foraminal stenosis. 4. T12-L1: Loss of disc signal. Facet arthrosis. 5. L1-L2: Loss of disc signal. Facet arthrosis. 6. L2-L3: Loss of disc signal. Minor retrolisthesis. Broad bulge, facet arthrosis and ligamentum flavum  hypertrophy with right foraminal herniation and foraminal stenosis right greater than left. 7. L3-L4: Grade 1 anterior spondylolisthesis. Broad bulge, facet arthrosis and ligamentum flavum hypertrophy and facet effusion left greater than right. Inferior foraminal stenosis. Broad herniation superimposed on the  bulge with mild central stenosis. 8. L4-L5: Grade 1 anterior spondylolisthesis. Loss of disc signal and height with Modic type 2 endplate change.  Vacuum disc. Bulge, spondylotic ridge and facet arthrosis with ligamentum flavum hypertrophy right greater  than left with facet effusions. Foraminal stenosis right greater than left. Broad bulge and spondylotic ridge impressing on the thecal sac with moderate central stenosis. 9. L5-S1: Loss of disc signal and height. Vacuum disc. Grade 1 anterior spondylolisthesis. Broad bulge, facet  arthrosis, ligamentum flavum hypertrophy and facet effusion with foraminal extension of bulge with right foraminal herniation and foraminal stenosis right greater than left. Broad bulge contacts the S1 nerve roots. No  central stenosis. 10. Parapelvic cyst at the left kidney. Ultrasound suggested for confirmation.    [] : no [FreeTextEntry7] : b/l back pain  down to the thighs  [FreeTextEntry8] : getting up in the morning [de-identified] : MRI lumbar 12/8/22

## 2024-01-11 NOTE — ASSESSMENT
[FreeTextEntry1] : After discussing various treatment options with the patient including but not limited to oral medications, physical therapy, exercise, modalities as well as interventional spinal injections, we have decided with the following plan:  1) Intervention Injection Therapy: I personally reviewed the MRI/CT scan images and agree with the radiologist's report. The radiological findings were discussed with the patient. The risks, benefits, contents and alternatives to injection were explained in full to the patient. Risks outlined include but are not limited to infection,sepsis, bleeding, post-dural puncture headache, nerve damage, temporary increase in pain, syncopal episode, failure to resolve symptoms, allergic reaction, symptom recurrence, and elevation of blood sugar in diabetics. Cortisone may cause immunosuppression. Patient understands the risks. All questions were answered. After discussion of options, patient requested an injection. Information regarding the injection was given to the patient. Which medications to stop prior to the injection was explained to the patient as well.  Follow up in 1-2 weeks post injection for re-evaluation.  Continue Home exercises, stretching, activity modification, physical therapy, and conservative care.  Patient is presenting with acute/sub-acute radicular pain with impairment in ADLs and functionality.  The pain has not responded sufficiently to  conservative care including nsaid therapy and/or physical therapy.  There is no bleeding tendency, unstable medical condition, or systemic infection. The purpose of the spinal injections is to facilitate active therapy by providing short term relief through reduction of pain and inflammation.   Injections, by themselves, are not likely to provide long-term relief. Rather, active rehabilitation with modified work achieves long-term relief by increasing active ROM, strength and stability.   Caudal

## 2024-07-11 ENCOUNTER — APPOINTMENT (OUTPATIENT)
Dept: PAIN MANAGEMENT | Facility: CLINIC | Age: 79
End: 2024-07-11
Payer: MEDICARE

## 2024-07-11 DIAGNOSIS — M54.16 RADICULOPATHY, LUMBAR REGION: ICD-10-CM

## 2024-07-11 PROCEDURE — 62323 NJX INTERLAMINAR LMBR/SAC: CPT

## 2024-07-28 ENCOUNTER — NON-APPOINTMENT (OUTPATIENT)
Age: 79
End: 2024-07-28

## 2024-08-01 ENCOUNTER — APPOINTMENT (OUTPATIENT)
Dept: PAIN MANAGEMENT | Facility: CLINIC | Age: 79
End: 2024-08-01
Payer: MEDICARE

## 2024-08-01 VITALS — BODY MASS INDEX: 26.63 KG/M2 | HEIGHT: 64 IN | WEIGHT: 156 LBS

## 2024-08-01 DIAGNOSIS — M54.16 RADICULOPATHY, LUMBAR REGION: ICD-10-CM

## 2024-08-01 PROCEDURE — 99213 OFFICE O/P EST LOW 20 MIN: CPT

## 2024-08-01 NOTE — ASSESSMENT
[FreeTextEntry1] : After discussing various treatment options with the patient including but not limited to oral medications, physical therapy, exercise, modalities as well as interventional spinal injections, we have decided with the following plan:  1) Intervention Injection Therapy: I personally reviewed the MRI/CT scan images and agree with the radiologist's report. The radiological findings were discussed with the patient. The risks, benefits, contents and alternatives to injection were explained in full to the patient. Risks outlined include but are not limited to infection,sepsis, bleeding, post-dural puncture headache, nerve damage, temporary increase in pain, syncopal episode, failure to resolve symptoms, allergic reaction, symptom recurrence, and elevation of blood sugar in diabetics. Cortisone may cause immunosuppression. Patient understands the risks. All questions were answered. After discussion of options, patient requested an injection. Information regarding the injection was given to the patient. Which medications to stop prior to the injection was explained to the patient as well.  Follow up in 1-2 weeks post injection for re-evaluation.  Continue Home exercises, stretching, activity modification, physical therapy, and conservative care.  Patient is presenting with acute/sub-acute radicular pain with impairment in ADLs and functionality.  The pain has not responded sufficiently to  conservative care including nsaid therapy and/or physical therapy.  There is no bleeding tendency, unstable medical condition, or systemic infection. The purpose of the spinal injections is to facilitate active therapy by providing short term relief through reduction of pain and inflammation.   Injections, by themselves, are not likely to provide long-term relief. Rather, active rehabilitation with modified work achieves long-term relief by increasing active ROM, strength and stability.   Caudal - will call

## 2024-08-01 NOTE — HISTORY OF PRESENT ILLNESS
[Lower back] : lower back [Dull/Aching] : dull/aching [Radiating] : radiating [Rest] : rest [Ice] : ice [Physical therapy] : physical therapy [Injection therapy] : injection therapy [Standing] : standing [Retired] : Work status: retired [4] : 4 [Intermittent] : intermittent [FreeTextEntry1] : 08/01/2024: follow up today for caudal on 7/11 with 20% relief ON TOP OF 40% RELIEF.    Feels much improved  01/11/2024: follow up today for caudal on 12/28 with 40% relief  10/19/2023: follow up today for lower back pain.  she did not start the Cymbalta as it states it can cause macular degeneration with continued use.  Pain in the back down both legs. numbness in the right leg. worse in the shin. She has been going to PT. She tripped and fell overnight and tore the skin on the right leg. (has only had 3  sessions of PT so far) acupuncture is not working.   can not take NSAIDS due to macular degeneration.   09/07/2023: follow up today for B/L L5-S1 TFESI on 8/21 50% relief.  will start cymbalta  BID.  12/23/2022 : Evaluation today for b/l leg pain. Pain worse in the posterior legs. intermittent numbness. Intermittent weakness. Had a hip injection with limited relief. She had started PT, however pain had got worse and wanted it better controlled.   Subjective Weakness: Yes Numbness/Tingling: Yes Bladder/Bowel dysfunction: No Physical Therapy: Current    Attempted modalities for current pain complaint: See above: Medications: No  Injections: Yes right hip injection (11/15/22)  B/L L5-S1 TFESI 8/21/23 caudal 12/28/23, 7/11/24 Previous Spine Surgery: N/A  Imaging: MRI Lumbar Spine (12/8/22)2. Hemangioma at L3 with no fracture. 3. T11-T12: Loss of disc signal and height with anterior spurring and bulging. Modic type 2 endplate change.  Minor retrolisthesis. Broad posterior bulge and spondylotic ridge asymmetric to the right impressing on the  thecal sac with extension into the right foramen and right sided canal stenosis with right foraminal stenosis. 4. T12-L1: Loss of disc signal. Facet arthrosis. 5. L1-L2: Loss of disc signal. Facet arthrosis. 6. L2-L3: Loss of disc signal. Minor retrolisthesis. Broad bulge, facet arthrosis and ligamentum flavum  hypertrophy with right foraminal herniation and foraminal stenosis right greater than left. 7. L3-L4: Grade 1 anterior spondylolisthesis. Broad bulge, facet arthrosis and ligamentum flavum hypertrophy and facet effusion left greater than right. Inferior foraminal stenosis. Broad herniation superimposed on the  bulge with mild central stenosis. 8. L4-L5: Grade 1 anterior spondylolisthesis. Loss of disc signal and height with Modic type 2 endplate change.  Vacuum disc. Bulge, spondylotic ridge and facet arthrosis with ligamentum flavum hypertrophy right greater  than left with facet effusions. Foraminal stenosis right greater than left. Broad bulge and spondylotic ridge impressing on the thecal sac with moderate central stenosis. 9. L5-S1: Loss of disc signal and height. Vacuum disc. Grade 1 anterior spondylolisthesis. Broad bulge, facet  arthrosis, ligamentum flavum hypertrophy and facet effusion with foraminal extension of bulge with right foraminal herniation and foraminal stenosis right greater than left. Broad bulge contacts the S1 nerve roots. No  central stenosis. 10. Parapelvic cyst at the left kidney. Ultrasound suggested for confirmation.    [] : no [FreeTextEntry7] : b/l back pain  down to the thighs  [FreeTextEntry8] : getting up in the morning [de-identified] : [facility] MRI lumbar 12/8/22

## 2024-08-01 NOTE — PHYSICAL EXAM
[NL (90)] : forward flexion 90 degrees [4___] : right dorsiflexors 4[unfilled]/5 [] : no palpable masses [de-identified] : extension 20 degrees

## 2024-12-12 ENCOUNTER — APPOINTMENT (OUTPATIENT)
Dept: PAIN MANAGEMENT | Facility: CLINIC | Age: 79
End: 2024-12-12
Payer: MEDICARE

## 2024-12-12 DIAGNOSIS — M54.16 RADICULOPATHY, LUMBAR REGION: ICD-10-CM

## 2024-12-12 PROCEDURE — 62323 NJX INTERLAMINAR LMBR/SAC: CPT

## 2024-12-18 ENCOUNTER — NON-APPOINTMENT (OUTPATIENT)
Age: 79
End: 2024-12-18

## 2025-01-02 ENCOUNTER — APPOINTMENT (OUTPATIENT)
Dept: PAIN MANAGEMENT | Facility: CLINIC | Age: 80
End: 2025-01-02
Payer: MEDICARE

## 2025-01-02 VITALS — BODY MASS INDEX: 25.27 KG/M2 | HEIGHT: 64 IN | WEIGHT: 148 LBS

## 2025-01-02 DIAGNOSIS — M54.16 RADICULOPATHY, LUMBAR REGION: ICD-10-CM

## 2025-01-02 DIAGNOSIS — M43.16 SPONDYLOLISTHESIS, LUMBAR REGION: ICD-10-CM

## 2025-01-02 PROCEDURE — 99214 OFFICE O/P EST MOD 30 MIN: CPT

## 2025-01-02 RX ORDER — ATORVASTATIN CALCIUM 80 MG/1
TABLET, FILM COATED ORAL
Refills: 0 | Status: ACTIVE | COMMUNITY

## 2025-01-02 RX ORDER — RALOXIFENE HYDROCHLORIDE 60 MG/1
TABLET, FILM COATED ORAL
Refills: 0 | Status: ACTIVE | COMMUNITY

## 2025-06-05 ENCOUNTER — APPOINTMENT (OUTPATIENT)
Dept: PAIN MANAGEMENT | Facility: CLINIC | Age: 80
End: 2025-06-05
Payer: MEDICARE

## 2025-06-05 DIAGNOSIS — M54.16 RADICULOPATHY, LUMBAR REGION: ICD-10-CM

## 2025-06-05 PROCEDURE — 62323 NJX INTERLAMINAR LMBR/SAC: CPT

## 2025-06-19 ENCOUNTER — APPOINTMENT (OUTPATIENT)
Dept: PAIN MANAGEMENT | Facility: CLINIC | Age: 80
End: 2025-06-19
Payer: MEDICARE

## 2025-06-19 VITALS — HEIGHT: 64 IN | BODY MASS INDEX: 25.27 KG/M2 | WEIGHT: 148 LBS

## 2025-06-19 PROBLEM — M79.18 MYALGIA, MULTIPLE SITES: Status: ACTIVE | Noted: 2025-06-19

## 2025-06-19 PROBLEM — M47.812 CERVICAL SPONDYLOSIS: Status: ACTIVE | Noted: 2025-06-19

## 2025-06-19 PROCEDURE — 20552 NJX 1/MLT TRIGGER POINT 1/2: CPT

## 2025-06-19 PROCEDURE — J3490M: CUSTOM | Mod: NC

## 2025-06-19 PROCEDURE — 99214 OFFICE O/P EST MOD 30 MIN: CPT | Mod: 25
